# Patient Record
Sex: FEMALE | Race: WHITE | Employment: FULL TIME | URBAN - NONMETROPOLITAN AREA
[De-identification: names, ages, dates, MRNs, and addresses within clinical notes are randomized per-mention and may not be internally consistent; named-entity substitution may affect disease eponyms.]

---

## 2018-02-12 DIAGNOSIS — F41.9 ANXIETY: ICD-10-CM

## 2018-02-12 RX ORDER — PAROXETINE HYDROCHLORIDE 20 MG/1
20 TABLET, FILM COATED ORAL EVERY MORNING
Qty: 90 TABLET | Refills: 3 | Status: SHIPPED | OUTPATIENT
Start: 2018-02-12 | End: 2019-10-10 | Stop reason: SDUPTHER

## 2019-10-10 ENCOUNTER — OFFICE VISIT (OUTPATIENT)
Dept: FAMILY MEDICINE CLINIC | Age: 59
End: 2019-10-10
Payer: COMMERCIAL

## 2019-10-10 VITALS
BODY MASS INDEX: 35.07 KG/M2 | OXYGEN SATURATION: 97 % | HEART RATE: 74 BPM | WEIGHT: 190.6 LBS | DIASTOLIC BLOOD PRESSURE: 82 MMHG | HEIGHT: 62 IN | SYSTOLIC BLOOD PRESSURE: 130 MMHG

## 2019-10-10 DIAGNOSIS — F41.9 ANXIETY: ICD-10-CM

## 2019-10-10 DIAGNOSIS — R23.2 HOT FLASHES: Primary | ICD-10-CM

## 2019-10-10 PROCEDURE — 99213 OFFICE O/P EST LOW 20 MIN: CPT | Performed by: FAMILY MEDICINE

## 2019-10-10 RX ORDER — PAROXETINE HYDROCHLORIDE 20 MG/1
20 TABLET, FILM COATED ORAL EVERY MORNING
Qty: 90 TABLET | Refills: 3 | Status: SHIPPED | OUTPATIENT
Start: 2019-10-10 | End: 2022-06-03 | Stop reason: SDUPTHER

## 2019-10-10 ASSESSMENT — PATIENT HEALTH QUESTIONNAIRE - PHQ9
2. FEELING DOWN, DEPRESSED OR HOPELESS: 0
SUM OF ALL RESPONSES TO PHQ QUESTIONS 1-9: 0
SUM OF ALL RESPONSES TO PHQ QUESTIONS 1-9: 0
SUM OF ALL RESPONSES TO PHQ9 QUESTIONS 1 & 2: 0
1. LITTLE INTEREST OR PLEASURE IN DOING THINGS: 0

## 2019-10-10 ASSESSMENT — ENCOUNTER SYMPTOMS
SHORTNESS OF BREATH: 0
CONSTIPATION: 0
DIARRHEA: 0
COLOR CHANGE: 0
ABDOMINAL PAIN: 0
CHEST TIGHTNESS: 0
BLOOD IN STOOL: 0
COUGH: 0

## 2020-03-25 PROBLEM — K21.9 GERD (GASTROESOPHAGEAL REFLUX DISEASE): Status: RESOLVED | Noted: 2020-03-25 | Resolved: 2020-03-24

## 2020-06-11 ENCOUNTER — OFFICE VISIT (OUTPATIENT)
Dept: FAMILY MEDICINE CLINIC | Age: 60
End: 2020-06-11

## 2020-06-11 VITALS
DIASTOLIC BLOOD PRESSURE: 80 MMHG | TEMPERATURE: 98.3 F | SYSTOLIC BLOOD PRESSURE: 145 MMHG | BODY MASS INDEX: 36.25 KG/M2 | HEART RATE: 78 BPM | WEIGHT: 197 LBS | OXYGEN SATURATION: 98 %

## 2020-06-11 LAB
A/G RATIO: 1.6 (ref 1.1–2.2)
ALBUMIN SERPL-MCNC: 4.4 G/DL (ref 3.4–5)
ALP BLD-CCNC: 113 U/L (ref 40–129)
ALT SERPL-CCNC: 16 U/L (ref 10–40)
ANION GAP SERPL CALCULATED.3IONS-SCNC: 10 MMOL/L (ref 3–16)
AST SERPL-CCNC: 17 U/L (ref 15–37)
BASOPHILS ABSOLUTE: 0.1 K/UL (ref 0–0.2)
BASOPHILS RELATIVE PERCENT: 1.4 %
BILIRUB SERPL-MCNC: 0.4 MG/DL (ref 0–1)
BILIRUBIN, POC: NEGATIVE
BLOOD URINE, POC: NORMAL
BUN BLDV-MCNC: 13 MG/DL (ref 7–20)
C-REACTIVE PROTEIN: 1.3 MG/L (ref 0–5.1)
CALCIUM SERPL-MCNC: 9.7 MG/DL (ref 8.3–10.6)
CHLORIDE BLD-SCNC: 100 MMOL/L (ref 99–110)
CLARITY, POC: NORMAL
CO2: 28 MMOL/L (ref 21–32)
COLOR, POC: YELLOW
CREAT SERPL-MCNC: <0.5 MG/DL (ref 0.6–1.1)
EOSINOPHILS ABSOLUTE: 0.2 K/UL (ref 0–0.6)
EOSINOPHILS RELATIVE PERCENT: 2.8 %
GFR AFRICAN AMERICAN: >60
GFR NON-AFRICAN AMERICAN: >60
GLOBULIN: 2.7 G/DL
GLUCOSE BLD-MCNC: 103 MG/DL (ref 70–99)
GLUCOSE URINE, POC: NEGATIVE
HCT VFR BLD CALC: 45.4 % (ref 36–48)
HEMOGLOBIN: 15 G/DL (ref 12–16)
KETONES, POC: NEGATIVE
LEUKOCYTE EST, POC: NORMAL
LYMPHOCYTES ABSOLUTE: 2.2 K/UL (ref 1–5.1)
LYMPHOCYTES RELATIVE PERCENT: 34.6 %
MCH RBC QN AUTO: 29.4 PG (ref 26–34)
MCHC RBC AUTO-ENTMCNC: 33.1 G/DL (ref 31–36)
MCV RBC AUTO: 89 FL (ref 80–100)
MONOCYTES ABSOLUTE: 0.5 K/UL (ref 0–1.3)
MONOCYTES RELATIVE PERCENT: 7.7 %
NEUTROPHILS ABSOLUTE: 3.4 K/UL (ref 1.7–7.7)
NEUTROPHILS RELATIVE PERCENT: 53.5 %
NITRITE, POC: NEGATIVE
PDW BLD-RTO: 13.9 % (ref 12.4–15.4)
PH, POC: 5.5
PLATELET # BLD: 259 K/UL (ref 135–450)
PMV BLD AUTO: 8.9 FL (ref 5–10.5)
POTASSIUM SERPL-SCNC: 5.1 MMOL/L (ref 3.5–5.1)
PROTEIN, POC: 100
RBC # BLD: 5.1 M/UL (ref 4–5.2)
SEDIMENTATION RATE, ERYTHROCYTE: 15 MM/HR (ref 0–30)
SODIUM BLD-SCNC: 138 MMOL/L (ref 136–145)
SPECIFIC GRAVITY, POC: >=1.03
TOTAL PROTEIN: 7.1 G/DL (ref 6.4–8.2)
UROBILINOGEN, POC: 0.2
WBC # BLD: 6.3 K/UL (ref 4–11)

## 2020-06-11 PROCEDURE — 81003 URINALYSIS AUTO W/O SCOPE: CPT | Performed by: NURSE PRACTITIONER

## 2020-06-11 PROCEDURE — 99213 OFFICE O/P EST LOW 20 MIN: CPT | Performed by: NURSE PRACTITIONER

## 2020-06-11 PROCEDURE — 36415 COLL VENOUS BLD VENIPUNCTURE: CPT | Performed by: NURSE PRACTITIONER

## 2020-06-11 ASSESSMENT — ENCOUNTER SYMPTOMS
DIARRHEA: 1
VOICE CHANGE: 0
SORE THROAT: 0
EYE ITCHING: 0
PHOTOPHOBIA: 0
CHOKING: 0
SINUS PAIN: 0
BACK PAIN: 0
EYE REDNESS: 0
COUGH: 0
COLOR CHANGE: 0
EYE DISCHARGE: 0
CHEST TIGHTNESS: 0
STRIDOR: 0
ABDOMINAL PAIN: 1
WHEEZING: 0
RHINORRHEA: 0
CONSTIPATION: 1
VOMITING: 0
NAUSEA: 1
SINUS PRESSURE: 0
SHORTNESS OF BREATH: 0
EYE PAIN: 0
BLOOD IN STOOL: 0
TROUBLE SWALLOWING: 0

## 2020-06-11 NOTE — PROGRESS NOTES
hearing loss, nosebleeds, postnasal drip, rhinorrhea, sinus pressure, sinus pain, sneezing, sore throat, tinnitus, trouble swallowing and voice change. Eyes: Negative for photophobia, pain, discharge, redness and itching. Respiratory: Negative for cough, choking, chest tightness, shortness of breath, wheezing and stridor. Cardiovascular: Negative for chest pain, palpitations and leg swelling. Gastrointestinal: Positive for abdominal pain, constipation, diarrhea and nausea. Negative for blood in stool and vomiting. Endocrine: Negative for cold intolerance, heat intolerance, polydipsia and polyuria. Genitourinary: Positive for flank pain. Negative for difficulty urinating, dysuria, enuresis, frequency, hematuria and urgency. Musculoskeletal: Negative for back pain, gait problem, joint swelling, neck pain and neck stiffness. Skin: Negative for color change, pallor, rash and wound. Allergic/Immunologic: Negative for environmental allergies and food allergies. Neurological: Negative for dizziness, tremors, syncope, speech difficulty, weakness, light-headedness, numbness and headaches. Hematological: Negative for adenopathy. Does not bruise/bleed easily. Psychiatric/Behavioral: Negative for agitation, behavioral problems, confusion, decreased concentration, dysphoric mood, hallucinations, self-injury, sleep disturbance and suicidal ideas. The patient is not nervous/anxious and is not hyperactive. Prior to Visit Medications    Medication Sig Taking?  Authorizing Provider   PARoxetine (PAXIL) 20 MG tablet Take 1 tablet by mouth every morning Yes Mac Head MD   vitamin B-12 (CYANOCOBALAMIN) 1000 MCG tablet Take 1,000 mcg by mouth daily Yes Historical Provider, MD       Allergies   Allergen Reactions    Morphine      Nausea      Morphine     Penicillins      Nausea      Penicillins     Prochlorperazine      dystonic    Prochlorperazine Edisylate        OBJECTIVE:      BP Readings

## 2020-06-12 LAB — URINE CULTURE, ROUTINE: NORMAL

## 2020-06-12 RX ORDER — OXYCODONE HYDROCHLORIDE AND ACETAMINOPHEN 5; 325 MG/1; MG/1
1 TABLET ORAL EVERY 8 HOURS PRN
Qty: 9 TABLET | Refills: 0 | Status: SHIPPED | OUTPATIENT
Start: 2020-06-12 | End: 2020-06-12 | Stop reason: SDUPTHER

## 2020-06-12 RX ORDER — OXYCODONE HYDROCHLORIDE AND ACETAMINOPHEN 5; 325 MG/1; MG/1
1 TABLET ORAL EVERY 8 HOURS PRN
Qty: 9 TABLET | Refills: 0 | Status: SHIPPED | OUTPATIENT
Start: 2020-06-12 | End: 2020-06-15

## 2020-07-04 ENCOUNTER — HOSPITAL ENCOUNTER (INPATIENT)
Age: 60
LOS: 3 days | Discharge: HOME | DRG: 660 | End: 2020-07-07
Attending: INTERNAL MEDICINE | Admitting: INTERNAL MEDICINE

## 2020-07-04 PROBLEM — N13.30 HYDRONEPHROSIS: Status: ACTIVE | Noted: 2020-07-04

## 2020-07-04 PROCEDURE — 1200000000 HC SEMI PRIVATE

## 2020-07-04 PROCEDURE — 2580000003 HC RX 258: Performed by: INTERNAL MEDICINE

## 2020-07-04 PROCEDURE — 99223 1ST HOSP IP/OBS HIGH 75: CPT | Performed by: INTERNAL MEDICINE

## 2020-07-04 PROCEDURE — 2060000000 HC ICU INTERMEDIATE R&B

## 2020-07-04 PROCEDURE — 2500000003 HC RX 250 WO HCPCS: Performed by: INTERNAL MEDICINE

## 2020-07-04 PROCEDURE — 6360000002 HC RX W HCPCS: Performed by: INTERNAL MEDICINE

## 2020-07-04 PROCEDURE — 6370000000 HC RX 637 (ALT 250 FOR IP): Performed by: INTERNAL MEDICINE

## 2020-07-04 RX ORDER — ACETAMINOPHEN 650 MG/1
650 SUPPOSITORY RECTAL EVERY 6 HOURS PRN
Status: DISCONTINUED | OUTPATIENT
Start: 2020-07-04 | End: 2020-07-07 | Stop reason: HOSPADM

## 2020-07-04 RX ORDER — CHOLECALCIFEROL (VITAMIN D3) 125 MCG
1000 CAPSULE ORAL DAILY
Status: CANCELLED | OUTPATIENT
Start: 2020-07-04

## 2020-07-04 RX ORDER — ONDANSETRON 2 MG/ML
4 INJECTION INTRAMUSCULAR; INTRAVENOUS EVERY 6 HOURS PRN
Status: DISCONTINUED | OUTPATIENT
Start: 2020-07-04 | End: 2020-07-07 | Stop reason: HOSPADM

## 2020-07-04 RX ORDER — SODIUM CHLORIDE 0.9 % (FLUSH) 0.9 %
10 SYRINGE (ML) INJECTION PRN
Status: DISCONTINUED | OUTPATIENT
Start: 2020-07-04 | End: 2020-07-07 | Stop reason: HOSPADM

## 2020-07-04 RX ORDER — PAROXETINE HYDROCHLORIDE 20 MG/1
20 TABLET, FILM COATED ORAL DAILY
Status: DISCONTINUED | OUTPATIENT
Start: 2020-07-04 | End: 2020-07-07 | Stop reason: HOSPADM

## 2020-07-04 RX ORDER — SODIUM CHLORIDE 0.9 % (FLUSH) 0.9 %
10 SYRINGE (ML) INJECTION EVERY 12 HOURS SCHEDULED
Status: CANCELLED | OUTPATIENT
Start: 2020-07-04

## 2020-07-04 RX ORDER — ACETAMINOPHEN 650 MG/1
650 SUPPOSITORY RECTAL EVERY 6 HOURS PRN
Status: CANCELLED | OUTPATIENT
Start: 2020-07-04

## 2020-07-04 RX ORDER — OXYCODONE HYDROCHLORIDE AND ACETAMINOPHEN 5; 325 MG/1; MG/1
1 TABLET ORAL EVERY 6 HOURS PRN
Status: DISCONTINUED | OUTPATIENT
Start: 2020-07-04 | End: 2020-07-07 | Stop reason: HOSPADM

## 2020-07-04 RX ORDER — POLYETHYLENE GLYCOL 3350 17 G/17G
17 POWDER, FOR SOLUTION ORAL DAILY PRN
Status: CANCELLED | OUTPATIENT
Start: 2020-07-04

## 2020-07-04 RX ORDER — ONDANSETRON 2 MG/ML
4 INJECTION INTRAMUSCULAR; INTRAVENOUS EVERY 6 HOURS PRN
Status: CANCELLED | OUTPATIENT
Start: 2020-07-04

## 2020-07-04 RX ORDER — SODIUM CHLORIDE 0.9 % (FLUSH) 0.9 %
10 SYRINGE (ML) INJECTION PRN
Status: CANCELLED | OUTPATIENT
Start: 2020-07-04

## 2020-07-04 RX ORDER — POLYETHYLENE GLYCOL 3350 17 G/17G
17 POWDER, FOR SOLUTION ORAL DAILY PRN
Status: DISCONTINUED | OUTPATIENT
Start: 2020-07-04 | End: 2020-07-07 | Stop reason: HOSPADM

## 2020-07-04 RX ORDER — ACETAMINOPHEN 325 MG/1
650 TABLET ORAL EVERY 6 HOURS PRN
Status: CANCELLED | OUTPATIENT
Start: 2020-07-04

## 2020-07-04 RX ORDER — SODIUM CHLORIDE 9 MG/ML
INJECTION, SOLUTION INTRAVENOUS CONTINUOUS
Status: DISCONTINUED | OUTPATIENT
Start: 2020-07-04 | End: 2020-07-07 | Stop reason: HOSPADM

## 2020-07-04 RX ORDER — KETOROLAC TROMETHAMINE 30 MG/ML
15 INJECTION, SOLUTION INTRAMUSCULAR; INTRAVENOUS EVERY 6 HOURS PRN
Status: DISPENSED | OUTPATIENT
Start: 2020-07-04 | End: 2020-07-06

## 2020-07-04 RX ORDER — SODIUM CHLORIDE 9 MG/ML
INJECTION, SOLUTION INTRAVENOUS CONTINUOUS
Status: CANCELLED | OUTPATIENT
Start: 2020-07-04

## 2020-07-04 RX ORDER — ACETAMINOPHEN 325 MG/1
650 TABLET ORAL EVERY 6 HOURS PRN
Status: DISCONTINUED | OUTPATIENT
Start: 2020-07-04 | End: 2020-07-07 | Stop reason: HOSPADM

## 2020-07-04 RX ORDER — SODIUM CHLORIDE 0.9 % (FLUSH) 0.9 %
10 SYRINGE (ML) INJECTION EVERY 12 HOURS SCHEDULED
Status: DISCONTINUED | OUTPATIENT
Start: 2020-07-04 | End: 2020-07-07 | Stop reason: HOSPADM

## 2020-07-04 RX ORDER — PROMETHAZINE HYDROCHLORIDE 25 MG/1
12.5 TABLET ORAL EVERY 6 HOURS PRN
Status: CANCELLED | OUTPATIENT
Start: 2020-07-04

## 2020-07-04 RX ORDER — PAROXETINE HYDROCHLORIDE 20 MG/1
20 TABLET, FILM COATED ORAL EVERY MORNING
Status: CANCELLED | OUTPATIENT
Start: 2020-07-04

## 2020-07-04 RX ORDER — CIPROFLOXACIN 500 MG/1
500 TABLET, FILM COATED ORAL EVERY 12 HOURS SCHEDULED
Status: DISCONTINUED | OUTPATIENT
Start: 2020-07-04 | End: 2020-07-07 | Stop reason: HOSPADM

## 2020-07-04 RX ADMIN — PAROXETINE HYDROCHLORIDE 20 MG: 20 TABLET, FILM COATED ORAL at 11:51

## 2020-07-04 RX ADMIN — CIPROFLOXACIN 500 MG: 500 TABLET, FILM COATED ORAL at 20:47

## 2020-07-04 RX ADMIN — HYDROMORPHONE HYDROCHLORIDE 0.5 MG: 1 INJECTION, SOLUTION INTRAMUSCULAR; INTRAVENOUS; SUBCUTANEOUS at 23:50

## 2020-07-04 RX ADMIN — OXYCODONE HYDROCHLORIDE AND ACETAMINOPHEN 1 TABLET: 5; 325 TABLET ORAL at 17:18

## 2020-07-04 RX ADMIN — Medication 10 ML: at 11:52

## 2020-07-04 RX ADMIN — SODIUM CHLORIDE: 9 INJECTION, SOLUTION INTRAVENOUS at 17:21

## 2020-07-04 RX ADMIN — KETOROLAC TROMETHAMINE 15 MG: 30 INJECTION, SOLUTION INTRAMUSCULAR; INTRAVENOUS at 12:32

## 2020-07-04 RX ADMIN — Medication 10 ML: at 20:47

## 2020-07-04 RX ADMIN — KETOROLAC TROMETHAMINE 15 MG: 30 INJECTION, SOLUTION INTRAMUSCULAR; INTRAVENOUS at 20:46

## 2020-07-04 RX ADMIN — HYDROMORPHONE HYDROCHLORIDE 0.5 MG: 1 INJECTION, SOLUTION INTRAMUSCULAR; INTRAVENOUS; SUBCUTANEOUS at 16:11

## 2020-07-04 RX ADMIN — HYDROMORPHONE HYDROCHLORIDE 0.5 MG: 1 INJECTION, SOLUTION INTRAMUSCULAR; INTRAVENOUS; SUBCUTANEOUS at 11:06

## 2020-07-04 ASSESSMENT — PAIN SCALES - GENERAL
PAINLEVEL_OUTOF10: 5
PAINLEVEL_OUTOF10: 5
PAINLEVEL_OUTOF10: 4
PAINLEVEL_OUTOF10: 3
PAINLEVEL_OUTOF10: 5
PAINLEVEL_OUTOF10: 6
PAINLEVEL_OUTOF10: 8
PAINLEVEL_OUTOF10: 3
PAINLEVEL_OUTOF10: 7

## 2020-07-04 ASSESSMENT — PAIN DESCRIPTION - PAIN TYPE
TYPE: ACUTE PAIN
TYPE: ACUTE PAIN

## 2020-07-04 ASSESSMENT — PAIN DESCRIPTION - ONSET
ONSET: ON-GOING
ONSET: ON-GOING

## 2020-07-04 ASSESSMENT — PAIN DESCRIPTION - LOCATION
LOCATION: ABDOMEN
LOCATION: ABDOMEN

## 2020-07-04 ASSESSMENT — PAIN DESCRIPTION - FREQUENCY
FREQUENCY: CONTINUOUS
FREQUENCY: CONTINUOUS

## 2020-07-04 ASSESSMENT — PAIN DESCRIPTION - PROGRESSION
CLINICAL_PROGRESSION: GRADUALLY WORSENING
CLINICAL_PROGRESSION: GRADUALLY WORSENING

## 2020-07-04 ASSESSMENT — PAIN DESCRIPTION - DESCRIPTORS
DESCRIPTORS: SHARP
DESCRIPTORS: SHARP

## 2020-07-04 ASSESSMENT — PAIN DESCRIPTION - ORIENTATION
ORIENTATION: RIGHT
ORIENTATION: RIGHT

## 2020-07-04 NOTE — PROGRESS NOTES
Received from [facility] via patient transport service. Alert and oriented. States pain med given at [facility] STATION is effective for pain right now. Oriented to room and use of call light. Instructed on NPO status. Pt. Verbalized understanding. Patient is able to demonstrated the ability to move from a reclining position to an upright position within the recliner.

## 2020-07-04 NOTE — H&P
Hospital Medicine History & Physical      PCP: Romario Keller MD    Date of Admission: 7/4/2020    Date of Service: Pt seen/examined on 7/4/2020    Chief Complaint: abdominal pain, N/V     History Of Present Illness: The patient is a 63 y.o. female with anxiety, GERD, fibromyalgia, YRN and recent renal stone s/p double J ureteral stent placement and lithotripsy on 6/29 who presented to [facility] ED with complaint of worsening abdominal pain and malaise. She reports that she was doing well despite hematuria after her procedure but developed some urinary symptoms and went to the ER at [facility] on Thursday. She reports she was dx with a UTI and was started on Cipro. She states that she has been compliant with her medications, but her pain continued and she developed N/V and returned to [facility] ER again. She had a CT scan which showed worsening hydronephrosis with perinephritic stranding and concerns for possible stent failure and was transferred to [facility] for urology consult. She denies any CP, cough, SOB or fevers at home. Past Medical History:        Diagnosis Date    Anxiety     Esophageal reflux     Fibromyalgia     GERD (gastroesophageal reflux disease)     Hydronephrosis 7/4/2020    Unspecified sleep apnea     cpap       Past Surgical History:        Procedure Laterality Date    CHOLECYSTECTOMY      CYSTOSCOPY  1/18/2013    HYSTEROSCOPY  2012    TOENAIL AVULSION         Medications Prior to Admission:    Prior to Admission medications    Medication Sig Start Date End Date Taking? Authorizing Provider   PARoxetine (PAXIL) 20 MG tablet Take 1 tablet by mouth every morning 10/10/19  Yes Romario Keller MD   vitamin B-12 (CYANOCOBALAMIN) 1000 MCG tablet Take 1,000 mcg by mouth daily    Historical Provider, MD       Allergies:  Morphine; Morphine; Penicillins;  Penicillins; Prochlorperazine; and Prochlorperazine edisylate    Social History:  The patient currently lives at home    TOBACCO: reports that she has never smoked. She has never used smokeless tobacco.  ETOH:   reports no history of alcohol use. Family History:   Positive as follows:        Problem Relation Age of Onset    Arthritis Mother     Asthma Mother     High Blood Pressure Mother     Heart Disease Sister     Heart Disease Father     Cancer Sister         breast       REVIEW OF SYSTEMS:     Constitutional: + malaise, Negative for fever   HENT: Negative for sore throat   Eyes: Negative for redness   Respiratory: Negative  for dyspnea, cough   Cardiovascular: Negative for chest pain   Gastrointestinal: + abdominal pain, + N/V  Genitourinary: + hematuria   Musculoskeletal: Negative for arthralgias   Skin: Negative for rash   Neurological: Negative for syncope   Hematological: Negative for adenopathy   Psychiatric/Behavorial: Negative for anxiety    PHYSICAL EXAM:    BP (!) 154/76   Pulse 78   Temp 98.4 °F (36.9 °C) (Oral)   Resp 18   Ht 5' 2\" (1.575 m)   Wt 195 lb (88.5 kg)   LMP 06/19/2016   SpO2 98%   BMI 35.67 kg/m²     Gen: No distress. Alert. Eyes:  No conjunctival injection. ENT: No discharge. Pharynx clear. Neck:  Trachea midline. Resp: No accessory muscle use. No crackles. No wheezes. No rhonchi. CV: Regular rate. Regular rhythm. No murmur. No rub. No edema. Capillary Refill: Brisk,< 3 seconds   Peripheral Pulses: +2 palpable, equal bilaterally   GI: Non-tender. Non-distended Normal bowel sounds. Skin: Warm and dry. M/S: No CVA TTP bilaterally  Neuro: Awake. Grossly nonfocal    Psych: Oriented x 3. No anxiety or agitation. I Thalia Powell [facility] and personally interviewed and examined patient, reviewed the data (labs and imaging) and after discussion with my PA formulated the plan. Agree with note with the following edits. HPI:     I reviewed the patient's Past Medical History, Past Surgical History, Medications, and Allergies.        7 y.o. female with anxiety, GERD, fibromyalgia, YRN and recent renal stone s/p double J ureteral stent placement and lithotripsy on 6/29 who presented to [facility] ED with complaint of worsening abdominal pain and malaise. She reports that she was doing well despite hematuria after her procedure but developed some urinary symptoms and went to the ER at [facility] on Thursday. She reports she was dx with a UTI and was started on Cipro. She states that she has been compliant with her medications, but her pain continued and she developed N/V and returned to [facility] ER again. General:  Awake, alert and oriented. Appears to be not in any distress  Mucous Membranes:  Pink , anicteric  Neck: No JVD, no carotid bruit, no thyromegaly  Chest:  Clear to auscultation bilaterally, no added sounds  Cardiovascular:  RRR S1S2 heard, no murmurs or gallops  Abdomen:  Soft, undistended, right flank pain and right cva tenderness , no organomegaly, BS present  Extremities: No edema or cyanosis.  Distal pulses well felt  Neurological : grossly normal              CBC:   Recent Labs     07/03/20 0055 07/04/20 0018   WBC 7.1 9.6   HGB 12.3 12.5   HCT 38.2 37.9   MCV 91.0 88.6    238     BMP:   Recent Labs     07/03/20 0055 07/04/20 0018    141   K 3.2* 3.7    104   CO2 30 25   BUN 12 15   CREATININE 0.9 1.0     LIVER PROFILE:   Recent Labs     07/03/20 0055 07/04/20 0018   AST 16 20   ALT 25 27   BILITOT 0.4 0.4   ALKPHOS 97 105     PT/INR:   Recent Labs     07/03/20 0055   INR 0.97*     APTT:   Recent Labs     07/03/20 0055   APTT 26.6     UA:  Recent Labs     07/04/20  0026   COLORU YELLOW   PHUR 8.0*   LABCAST NEGATIVE   MUCUS NEGATIVE   TRICHOMONAS NEGATIVE   YEAST NEGATIVE   BACTERIA RARE   CLARITYU CLEAR   SPECGRAV 1.025   LEUKOCYTESUR SMALL  0-5   UROBILINOGEN 0.2   BILIRUBINUR NEGATIVE   GLUCOSEU NEGATIVE   AMORPHOUS NEGATIVE     CULTURES  None     EKG:    No EKG from this admission for review    RADIOLOGY  No orders to display     Pertinent previous results reviewed   CT A/P (From [facility])   IMPRESSION:   1. Right double-J ureteral stent in place, with interval worsening of the   right hydronephrosis, now mild to moderate in degree.  Stent may be   nonfunctioning given the worsening hydronephrosis   2. Multiple stone fragments seen adjacent to the mid and distal portions of   the ureteral stent, and within the right pelvocaliceal system. .   3. Small amount of free fluid seen within the cul-de-sac         ASSESSMENT/PLAN:    R hydronephrosis   - worsening since ureteral stent placement on Monday concerning for possible non-functioning stent  - retained fragments from stone from lithotripsy on Monday as well, one as large as 7mm  - Urology consult   - PRN pain and symptom control   - No associated TORO     UTI  - recently dx at [facility]   - Will attempt to get urine cx results   - Continue Cipro for now     YRN  - home CPAP    DVT Prophylaxis: Lovenox   Diet: Diet NPO, After Midnight  DIET GENERAL;   Code Status: Full Code      Ronna Patel PA-C  7/4/2020 10:59 AM     Agree with above  Changes made to note    Dipti Cowart MD 7/4/2020 2:59 PM

## 2020-07-04 NOTE — PLAN OF CARE
Problem: Infection:  Goal: Will remain free from infection  Description: Will remain free from infection  Outcome: Ongoing     Problem: Safety:  Goal: Free from accidental physical injury  Description: Free from accidental physical injury  Outcome: Ongoing     Problem: Pain:  Goal: Patient's pain/discomfort is manageable  Description: Patient's pain/discomfort is manageable  Outcome: Ongoing

## 2020-07-05 LAB
ANION GAP SERPL CALCULATED.3IONS-SCNC: 9 MMOL/L (ref 3–16)
BUN BLDV-MCNC: 10 MG/DL (ref 7–20)
CALCIUM SERPL-MCNC: 8 MG/DL (ref 8.3–10.6)
CHLORIDE BLD-SCNC: 106 MMOL/L (ref 99–110)
CO2: 26 MMOL/L (ref 21–32)
CREAT SERPL-MCNC: 0.8 MG/DL (ref 0.6–1.1)
GFR AFRICAN AMERICAN: >60
GFR NON-AFRICAN AMERICAN: >60
GLUCOSE BLD-MCNC: 105 MG/DL (ref 70–99)
POTASSIUM REFLEX MAGNESIUM: 4.1 MMOL/L (ref 3.5–5.1)
SODIUM BLD-SCNC: 141 MMOL/L (ref 136–145)

## 2020-07-05 PROCEDURE — 99232 SBSQ HOSP IP/OBS MODERATE 35: CPT | Performed by: INTERNAL MEDICINE

## 2020-07-05 PROCEDURE — 1200000000 HC SEMI PRIVATE

## 2020-07-05 PROCEDURE — 6370000000 HC RX 637 (ALT 250 FOR IP): Performed by: INTERNAL MEDICINE

## 2020-07-05 PROCEDURE — 2500000003 HC RX 250 WO HCPCS: Performed by: INTERNAL MEDICINE

## 2020-07-05 PROCEDURE — 2580000003 HC RX 258: Performed by: INTERNAL MEDICINE

## 2020-07-05 PROCEDURE — 6360000002 HC RX W HCPCS: Performed by: INTERNAL MEDICINE

## 2020-07-05 PROCEDURE — 80048 BASIC METABOLIC PNL TOTAL CA: CPT

## 2020-07-05 PROCEDURE — 36415 COLL VENOUS BLD VENIPUNCTURE: CPT

## 2020-07-05 PROCEDURE — 6370000000 HC RX 637 (ALT 250 FOR IP): Performed by: UROLOGY

## 2020-07-05 RX ORDER — DOCUSATE SODIUM 100 MG/1
100 CAPSULE, LIQUID FILLED ORAL 2 TIMES DAILY
Status: DISCONTINUED | OUTPATIENT
Start: 2020-07-05 | End: 2020-07-07 | Stop reason: HOSPADM

## 2020-07-05 RX ADMIN — HYDROMORPHONE HYDROCHLORIDE 0.5 MG: 1 INJECTION, SOLUTION INTRAMUSCULAR; INTRAVENOUS; SUBCUTANEOUS at 07:59

## 2020-07-05 RX ADMIN — PAROXETINE HYDROCHLORIDE 20 MG: 20 TABLET, FILM COATED ORAL at 09:04

## 2020-07-05 RX ADMIN — ENOXAPARIN SODIUM 40 MG: 40 INJECTION SUBCUTANEOUS at 09:04

## 2020-07-05 RX ADMIN — SODIUM CHLORIDE: 9 INJECTION, SOLUTION INTRAVENOUS at 20:51

## 2020-07-05 RX ADMIN — KETOROLAC TROMETHAMINE 15 MG: 30 INJECTION, SOLUTION INTRAMUSCULAR; INTRAVENOUS at 18:13

## 2020-07-05 RX ADMIN — Medication 10 ML: at 09:07

## 2020-07-05 RX ADMIN — OXYCODONE HYDROCHLORIDE AND ACETAMINOPHEN 1 TABLET: 5; 325 TABLET ORAL at 06:56

## 2020-07-05 RX ADMIN — HYDROMORPHONE HYDROCHLORIDE 0.5 MG: 1 INJECTION, SOLUTION INTRAMUSCULAR; INTRAVENOUS; SUBCUTANEOUS at 11:56

## 2020-07-05 RX ADMIN — HYDROMORPHONE HYDROCHLORIDE 0.5 MG: 1 INJECTION, SOLUTION INTRAMUSCULAR; INTRAVENOUS; SUBCUTANEOUS at 03:57

## 2020-07-05 RX ADMIN — CIPROFLOXACIN 500 MG: 500 TABLET, FILM COATED ORAL at 09:04

## 2020-07-05 RX ADMIN — CIPROFLOXACIN 500 MG: 500 TABLET, FILM COATED ORAL at 20:50

## 2020-07-05 RX ADMIN — DOCUSATE SODIUM 100 MG: 100 CAPSULE, LIQUID FILLED ORAL at 20:50

## 2020-07-05 RX ADMIN — DOCUSATE SODIUM 100 MG: 100 CAPSULE, LIQUID FILLED ORAL at 11:50

## 2020-07-05 RX ADMIN — HYDROMORPHONE HYDROCHLORIDE 0.5 MG: 1 INJECTION, SOLUTION INTRAMUSCULAR; INTRAVENOUS; SUBCUTANEOUS at 17:43

## 2020-07-05 RX ADMIN — OXYCODONE HYDROCHLORIDE AND ACETAMINOPHEN 1 TABLET: 5; 325 TABLET ORAL at 23:17

## 2020-07-05 RX ADMIN — SODIUM CHLORIDE: 9 INJECTION, SOLUTION INTRAVENOUS at 02:54

## 2020-07-05 RX ADMIN — OXYCODONE HYDROCHLORIDE AND ACETAMINOPHEN 1 TABLET: 5; 325 TABLET ORAL at 15:30

## 2020-07-05 RX ADMIN — KETOROLAC TROMETHAMINE 15 MG: 30 INJECTION, SOLUTION INTRAMUSCULAR; INTRAVENOUS at 02:52

## 2020-07-05 ASSESSMENT — PAIN SCALES - GENERAL
PAINLEVEL_OUTOF10: 6
PAINLEVEL_OUTOF10: 5
PAINLEVEL_OUTOF10: 3
PAINLEVEL_OUTOF10: 6
PAINLEVEL_OUTOF10: 5
PAINLEVEL_OUTOF10: 3
PAINLEVEL_OUTOF10: 4
PAINLEVEL_OUTOF10: 6
PAINLEVEL_OUTOF10: 4
PAINLEVEL_OUTOF10: 5
PAINLEVEL_OUTOF10: 2
PAINLEVEL_OUTOF10: 5
PAINLEVEL_OUTOF10: 0

## 2020-07-05 ASSESSMENT — PAIN DESCRIPTION - DESCRIPTORS
DESCRIPTORS: SHARP
DESCRIPTORS: SHARP;PRESSURE

## 2020-07-05 ASSESSMENT — PAIN DESCRIPTION - PAIN TYPE
TYPE: ACUTE PAIN

## 2020-07-05 ASSESSMENT — PAIN DESCRIPTION - PROGRESSION
CLINICAL_PROGRESSION: GRADUALLY WORSENING
CLINICAL_PROGRESSION: GRADUALLY WORSENING
CLINICAL_PROGRESSION: NOT CHANGED

## 2020-07-05 ASSESSMENT — PAIN DESCRIPTION - ONSET
ONSET: ON-GOING

## 2020-07-05 ASSESSMENT — PAIN DESCRIPTION - LOCATION
LOCATION: ABDOMEN

## 2020-07-05 ASSESSMENT — PAIN DESCRIPTION - FREQUENCY
FREQUENCY: CONTINUOUS

## 2020-07-05 ASSESSMENT — PAIN DESCRIPTION - ORIENTATION
ORIENTATION: RIGHT

## 2020-07-05 ASSESSMENT — PAIN - FUNCTIONAL ASSESSMENT: PAIN_FUNCTIONAL_ASSESSMENT: ACTIVITIES ARE NOT PREVENTED

## 2020-07-05 NOTE — PROGRESS NOTES
IM Progress Note    Admit Date:  7/4/2020  1    Interval history:  No fevers      Subjective:  Ms. Bryanna Wick reports persistent pain in right flank. No hematuria  Pain meds helping    Objective:   /64   Pulse 79   Temp 97.6 °F (36.4 °C) (Oral)   Resp 16   Ht 5' 2\" (1.575 m)   Wt 195 lb (88.5 kg)   LMP 06/19/2016   SpO2 97%   BMI 35.67 kg/m²       Intake/Output Summary (Last 24 hours) at 7/5/2020 0811  Last data filed at 7/5/2020 2225  Gross per 24 hour   Intake 2758 ml   Output --   Net 2758 ml       Physical Exam:    General:  Awake, alert and oriented. Appears to be not in any distress  Mucous Membranes:  Pink , anicteric  Neck: No JVD, no carotid bruit, no thyromegaly  Chest:  Clear to auscultation bilaterally, no added sounds  Cardiovascular:  RRR S1S2 heard, no murmurs or gallops  Abdomen:  Soft, undistended, right flank pain and right cva tenderness , no organomegaly, BS present  Extremities: No edema or cyanosis. Distal pulses well felt  Neurological : grossly normal    Medications:   Scheduled Medications:    sodium chloride flush  10 mL Intravenous 2 times per day    enoxaparin  40 mg Subcutaneous Daily    PARoxetine  20 mg Oral Daily    ciprofloxacin  500 mg Oral 2 times per day     I   sodium chloride 100 mL/hr at 07/05/20 0254     sodium chloride flush, acetaminophen **OR** acetaminophen, polyethylene glycol, ondansetron, HYDROmorphone, ketorolac, oxyCODONE-acetaminophen    Lab Data:  Recent Labs     07/03/20  0055 07/04/20  0018   WBC 7.1 9.6   HGB 12.3 12.5   HCT 38.2 37.9   MCV 91.0 88.6    238     Recent Labs     07/03/20  0055 07/04/20  0018 07/05/20  0627    141 141   K 3.2* 3.7 4.1    104 106   CO2 30 25 26   BUN 12 15 10   CREATININE 0.9 1.0 0.8     No results for input(s): CKTOTAL, CKMB, CKMBINDEX, TROPONINI in the last 72 hours.     Coagulation:   Lab Results   Component Value Date    INR 0.97 07/03/2020    APTT 26.6 07/03/2020     Cardiac markers: No results

## 2020-07-05 NOTE — PLAN OF CARE
Problem: Infection:  Goal: Will remain free from infection  Description: Will remain free from infection  7/5/2020 1721 by Jhoan Bush RN  Outcome: Ongoing  7/5/2020 1416 by Jhoan Bush RN  Outcome: Ongoing     Problem: Pain:  Goal: Patient's pain/discomfort is manageable  Description: Patient's pain/discomfort is manageable  7/5/2020 1721 by Jhoan Bush RN  Outcome: Ongoing  7/5/2020 1416 by Jhoan Bush RN  Outcome: Ongoing     Problem: Pain:  Goal: Pain level will decrease  Description: Pain level will decrease  Outcome: Ongoing

## 2020-07-05 NOTE — PLAN OF CARE
Problem: Infection:  Goal: Will remain free from infection  Description: Will remain free from infection  7/5/2020 1416 by Tray Echevarria RN  Outcome: Ongoing  7/5/2020 0028 by Felix Marshall RN  Outcome: Ongoing     Problem: Pain:  Goal: Patient's pain/discomfort is manageable  Description: Patient's pain/discomfort is manageable  7/5/2020 1416 by Tray Echevarria RN  Outcome: Ongoing  7/5/2020 0028 by Felix Marshall RN  Outcome: Ongoing

## 2020-07-05 NOTE — PROGRESS NOTES
Resting in bed.  at bedside. No new c/o. Aware of plan for NPO post midnight. Denies pain at this time.

## 2020-07-05 NOTE — PROGRESS NOTES
Shift assessment complete. Pt is alert and oriented. Pt is hypotensive; toradol given for pain. Respirations are even and easy. No other complaints voiced. Pt denies needs at this time. SR up x 2 and bed in low position. Call light is within reach. Will continue to monitor.

## 2020-07-05 NOTE — PROGRESS NOTES
Awake and in bed. C/o right flank pain. Dilaudid given per request. Aware of NPO post midnight tonight for procedure tomorrow. Encouraged out of bed and ambulating in room today. Pt verbalized understanding.

## 2020-07-05 NOTE — CONSULTS
7/5/2020  Eva Brumfield    Reason for Consult:  Kidney stones , stent, hydronephrosis  Requesting Physician:  Man Lopez      History Obtained From:  patient    HISTORY OF PRESENT ILLNESS:                The patient is a 63 y.o. female who presents with increasing right flank pain, severe over last 2-3 days. She is s/p ESWL last Monday for large renal stone , has stent placed at that time. She had some nausea and vomiting, no fever or chills. CT scan multiple stone fragments and possible obstructed stent. Past Medical History:        Diagnosis Date    Anxiety     Esophageal reflux     Fibromyalgia     GERD (gastroesophageal reflux disease)     Hydronephrosis 7/4/2020    Unspecified sleep apnea     cpap     Past Surgical History:        Procedure Laterality Date    CHOLECYSTECTOMY      CYSTOSCOPY  1/18/2013    HYSTEROSCOPY  2012    TOENAIL AVULSION       Current Medications:   Current Facility-Administered Medications: sodium chloride flush 0.9 % injection 10 mL, 10 mL, Intravenous, 2 times per day  sodium chloride flush 0.9 % injection 10 mL, 10 mL, Intravenous, PRN  acetaminophen (TYLENOL) tablet 650 mg, 650 mg, Oral, Q6H PRN **OR** acetaminophen (TYLENOL) suppository 650 mg, 650 mg, Rectal, Q6H PRN  polyethylene glycol (GLYCOLAX) packet 17 g, 17 g, Oral, Daily PRN  enoxaparin (LOVENOX) injection 40 mg, 40 mg, Subcutaneous, Daily  ondansetron (ZOFRAN) injection 4 mg, 4 mg, Intravenous, Q6H PRN  0.9 % sodium chloride infusion, , Intravenous, Continuous  HYDROmorphone (DILAUDID) injection 0.5 mg, 0.5 mg, Intravenous, Q4H PRN  ketorolac (TORADOL) injection 15 mg, 15 mg, Intravenous, Q6H PRN  PARoxetine (PAXIL) tablet 20 mg, 20 mg, Oral, Daily  ciprofloxacin (CIPRO) tablet 500 mg, 500 mg, Oral, 2 times per day  oxyCODONE-acetaminophen (PERCOCET) 5-325 MG per tablet 1 tablet, 1 tablet, Oral, Q6H PRN  Allergies:     Allergies   Allergen Reactions    Morphine      Nausea      Morphine     Penicillins      Nausea      Penicillins     Prochlorperazine      dystonic    Prochlorperazine Edisylate      Social History:  Reviewed, non contributory    Family History:  Reviewed, non contributory      REVIEW OF SYSTEMS:    12 system review , negative except gu symptoms and some constipation    PHYSICAL EXAM:    VITALS:  /64   Pulse 79   Temp 97.6 °F (36.4 °C) (Oral)   Resp 16   Ht 5' 2\" (1.575 m)   Wt 195 lb (88.5 kg)   LMP 06/19/2016   SpO2 97%   BMI 35.67 kg/m²   H&N: Sclera normal, no masses, trachea midline, no bruit  CVS: Normal rate and rhythm, no murmurs or rubs, peripheral pulses equal, no clubbing or cyanosis. RESP: Breath sounds equal bilateral, few rhonchi. ABDO: Soft, non-tender, bowel sounds active, no organomegaly, no hernias. LYMPH:  No lymphadenopathy. Skin: Warm dry and intact. : No CVAT, normal external genitalia, no discharge. MSK: Grossly normal for patient  GALDINO: Grossly normal for patient  PSY: No acute changes noted in psychosocial assessment. DATA:    Old records have not been requested    IMPRESSION/RECOMMENDATIONS:      Pt is s/p ESWL, increasing right flank plain, she has multiple stone fragments on recent ct with some worsening hydro. She is afebrile and normal renal function. Explained options , will add on for dr Lavon Rivera tomorrow for ureteroscopy, pt understands she may need another stent and may not be able to clear all of her fragments in one procedure    Thank you for asking me to see this interesting patient.     [facility]

## 2020-07-05 NOTE — PROGRESS NOTES
Call from Dr. Fredi Olmstead, would like to move pt's surgery to tomorrow. Spoke with pt to let her know; Dr. Fredi Olmstead will be here later today to discuss with pt.

## 2020-07-06 ENCOUNTER — ANESTHESIA (OUTPATIENT)
Dept: OPERATING ROOM | Age: 60
DRG: 660 | End: 2020-07-06

## 2020-07-06 ENCOUNTER — ANESTHESIA EVENT (OUTPATIENT)
Dept: OPERATING ROOM | Age: 60
DRG: 660 | End: 2020-07-06

## 2020-07-06 ENCOUNTER — APPOINTMENT (OUTPATIENT)
Dept: GENERAL RADIOLOGY | Age: 60
DRG: 660 | End: 2020-07-06
Attending: INTERNAL MEDICINE

## 2020-07-06 VITALS
RESPIRATION RATE: 30 BRPM | DIASTOLIC BLOOD PRESSURE: 63 MMHG | OXYGEN SATURATION: 85 % | SYSTOLIC BLOOD PRESSURE: 95 MMHG

## 2020-07-06 PROCEDURE — 6360000002 HC RX W HCPCS: Performed by: INTERNAL MEDICINE

## 2020-07-06 PROCEDURE — 88300 SURGICAL PATH GROSS: CPT

## 2020-07-06 PROCEDURE — 0TP98DZ REMOVAL OF INTRALUMINAL DEVICE FROM URETER, VIA NATURAL OR ARTIFICIAL OPENING ENDOSCOPIC: ICD-10-PCS | Performed by: UROLOGY

## 2020-07-06 PROCEDURE — 6370000000 HC RX 637 (ALT 250 FOR IP): Performed by: INTERNAL MEDICINE

## 2020-07-06 PROCEDURE — 2709999900 HC NON-CHARGEABLE SUPPLY: Performed by: UROLOGY

## 2020-07-06 PROCEDURE — 2580000003 HC RX 258: Performed by: INTERNAL MEDICINE

## 2020-07-06 PROCEDURE — 99232 SBSQ HOSP IP/OBS MODERATE 35: CPT | Performed by: INTERNAL MEDICINE

## 2020-07-06 PROCEDURE — BT1D1ZZ FLUOROSCOPY OF RIGHT KIDNEY, URETER AND BLADDER USING LOW OSMOLAR CONTRAST: ICD-10-PCS | Performed by: UROLOGY

## 2020-07-06 PROCEDURE — 3700000001 HC ADD 15 MINUTES (ANESTHESIA): Performed by: UROLOGY

## 2020-07-06 PROCEDURE — 3600000004 HC SURGERY LEVEL 4 BASE: Performed by: UROLOGY

## 2020-07-06 PROCEDURE — C2617 STENT, NON-COR, TEM W/O DEL: HCPCS | Performed by: UROLOGY

## 2020-07-06 PROCEDURE — 3600000014 HC SURGERY LEVEL 4 ADDTL 15MIN: Performed by: UROLOGY

## 2020-07-06 PROCEDURE — 6360000002 HC RX W HCPCS: Performed by: NURSE ANESTHETIST, CERTIFIED REGISTERED

## 2020-07-06 PROCEDURE — 3700000000 HC ANESTHESIA ATTENDED CARE: Performed by: UROLOGY

## 2020-07-06 PROCEDURE — 7100000000 HC PACU RECOVERY - FIRST 15 MIN: Performed by: UROLOGY

## 2020-07-06 PROCEDURE — 0TC68ZZ EXTIRPATION OF MATTER FROM RIGHT URETER, VIA NATURAL OR ARTIFICIAL OPENING ENDOSCOPIC: ICD-10-PCS | Performed by: UROLOGY

## 2020-07-06 PROCEDURE — 6360000004 HC RX CONTRAST MEDICATION: Performed by: UROLOGY

## 2020-07-06 PROCEDURE — 6360000002 HC RX W HCPCS: Performed by: ANESTHESIOLOGY

## 2020-07-06 PROCEDURE — 74420 UROGRAPHY RTRGR +-KUB: CPT

## 2020-07-06 PROCEDURE — C1769 GUIDE WIRE: HCPCS | Performed by: UROLOGY

## 2020-07-06 PROCEDURE — 6370000000 HC RX 637 (ALT 250 FOR IP): Performed by: UROLOGY

## 2020-07-06 PROCEDURE — 2500000003 HC RX 250 WO HCPCS: Performed by: NURSE ANESTHETIST, CERTIFIED REGISTERED

## 2020-07-06 PROCEDURE — 82365 CALCULUS SPECTROSCOPY: CPT

## 2020-07-06 PROCEDURE — 7100000001 HC PACU RECOVERY - ADDTL 15 MIN: Performed by: UROLOGY

## 2020-07-06 PROCEDURE — 1200000000 HC SEMI PRIVATE

## 2020-07-06 PROCEDURE — 2720000010 HC SURG SUPPLY STERILE: Performed by: UROLOGY

## 2020-07-06 PROCEDURE — 2580000003 HC RX 258: Performed by: UROLOGY

## 2020-07-06 PROCEDURE — 0T768DZ DILATION OF RIGHT URETER WITH INTRALUMINAL DEVICE, VIA NATURAL OR ARTIFICIAL OPENING ENDOSCOPIC: ICD-10-PCS | Performed by: UROLOGY

## 2020-07-06 DEVICE — URETERAL STENT
Type: IMPLANTABLE DEVICE | Site: URETER | Status: FUNCTIONAL
Brand: PERCUFLEX™ PLUS

## 2020-07-06 RX ORDER — FENTANYL CITRATE 50 UG/ML
INJECTION, SOLUTION INTRAMUSCULAR; INTRAVENOUS PRN
Status: DISCONTINUED | OUTPATIENT
Start: 2020-07-06 | End: 2020-07-06 | Stop reason: SDUPTHER

## 2020-07-06 RX ORDER — LIDOCAINE HYDROCHLORIDE 20 MG/ML
JELLY TOPICAL PRN
Status: DISCONTINUED | OUTPATIENT
Start: 2020-07-06 | End: 2020-07-06 | Stop reason: HOSPADM

## 2020-07-06 RX ORDER — LABETALOL HYDROCHLORIDE 5 MG/ML
5 INJECTION, SOLUTION INTRAVENOUS EVERY 10 MIN PRN
Status: DISCONTINUED | OUTPATIENT
Start: 2020-07-06 | End: 2020-07-06 | Stop reason: HOSPADM

## 2020-07-06 RX ORDER — OXYCODONE HYDROCHLORIDE AND ACETAMINOPHEN 5; 325 MG/1; MG/1
1 TABLET ORAL PRN
Status: DISCONTINUED | OUTPATIENT
Start: 2020-07-06 | End: 2020-07-06 | Stop reason: HOSPADM

## 2020-07-06 RX ORDER — OXYCODONE HYDROCHLORIDE AND ACETAMINOPHEN 5; 325 MG/1; MG/1
2 TABLET ORAL PRN
Status: DISCONTINUED | OUTPATIENT
Start: 2020-07-06 | End: 2020-07-06 | Stop reason: HOSPADM

## 2020-07-06 RX ORDER — ONDANSETRON 2 MG/ML
4 INJECTION INTRAMUSCULAR; INTRAVENOUS
Status: DISCONTINUED | OUTPATIENT
Start: 2020-07-06 | End: 2020-07-06 | Stop reason: HOSPADM

## 2020-07-06 RX ORDER — HYDRALAZINE HYDROCHLORIDE 20 MG/ML
5 INJECTION INTRAMUSCULAR; INTRAVENOUS EVERY 10 MIN PRN
Status: DISCONTINUED | OUTPATIENT
Start: 2020-07-06 | End: 2020-07-06 | Stop reason: HOSPADM

## 2020-07-06 RX ORDER — ALPRAZOLAM 0.5 MG/1
0.5 TABLET ORAL EVERY 6 HOURS PRN
Status: DISCONTINUED | OUTPATIENT
Start: 2020-07-06 | End: 2020-07-07 | Stop reason: HOSPADM

## 2020-07-06 RX ORDER — ROCURONIUM BROMIDE 10 MG/ML
INJECTION, SOLUTION INTRAVENOUS PRN
Status: DISCONTINUED | OUTPATIENT
Start: 2020-07-06 | End: 2020-07-06 | Stop reason: SDUPTHER

## 2020-07-06 RX ORDER — MAGNESIUM HYDROXIDE 1200 MG/15ML
LIQUID ORAL PRN
Status: DISCONTINUED | OUTPATIENT
Start: 2020-07-06 | End: 2020-07-06 | Stop reason: HOSPADM

## 2020-07-06 RX ORDER — DIPHENHYDRAMINE HYDROCHLORIDE 50 MG/ML
12.5 INJECTION INTRAMUSCULAR; INTRAVENOUS
Status: DISCONTINUED | OUTPATIENT
Start: 2020-07-06 | End: 2020-07-06 | Stop reason: HOSPADM

## 2020-07-06 RX ORDER — MEPERIDINE HYDROCHLORIDE 25 MG/ML
12.5 INJECTION INTRAMUSCULAR; INTRAVENOUS; SUBCUTANEOUS EVERY 5 MIN PRN
Status: DISCONTINUED | OUTPATIENT
Start: 2020-07-06 | End: 2020-07-06 | Stop reason: HOSPADM

## 2020-07-06 RX ORDER — PROPOFOL 10 MG/ML
INJECTION, EMULSION INTRAVENOUS PRN
Status: DISCONTINUED | OUTPATIENT
Start: 2020-07-06 | End: 2020-07-06 | Stop reason: SDUPTHER

## 2020-07-06 RX ADMIN — DOCUSATE SODIUM 100 MG: 100 CAPSULE, LIQUID FILLED ORAL at 20:53

## 2020-07-06 RX ADMIN — SODIUM CHLORIDE: 9 INJECTION, SOLUTION INTRAVENOUS at 02:17

## 2020-07-06 RX ADMIN — OXYCODONE HYDROCHLORIDE AND ACETAMINOPHEN 1 TABLET: 5; 325 TABLET ORAL at 09:53

## 2020-07-06 RX ADMIN — PAROXETINE HYDROCHLORIDE 20 MG: 20 TABLET, FILM COATED ORAL at 08:39

## 2020-07-06 RX ADMIN — ALPRAZOLAM 0.5 MG: 0.5 TABLET ORAL at 11:48

## 2020-07-06 RX ADMIN — FENTANYL CITRATE 100 MCG: 50 INJECTION INTRAMUSCULAR; INTRAVENOUS at 14:43

## 2020-07-06 RX ADMIN — CIPROFLOXACIN 500 MG: 500 TABLET, FILM COATED ORAL at 08:39

## 2020-07-06 RX ADMIN — CIPROFLOXACIN 500 MG: 500 TABLET, FILM COATED ORAL at 20:53

## 2020-07-06 RX ADMIN — ROCURONIUM BROMIDE 40 MG: 10 INJECTION, SOLUTION INTRAVENOUS at 14:44

## 2020-07-06 RX ADMIN — SUGAMMADEX 400 MG: 100 INJECTION, SOLUTION INTRAVENOUS at 15:12

## 2020-07-06 RX ADMIN — HYDROMORPHONE HYDROCHLORIDE 0.5 MG: 1 INJECTION, SOLUTION INTRAMUSCULAR; INTRAVENOUS; SUBCUTANEOUS at 15:44

## 2020-07-06 RX ADMIN — PROPOFOL 200 MG: 10 INJECTION, EMULSION INTRAVENOUS at 14:44

## 2020-07-06 RX ADMIN — DOCUSATE SODIUM 100 MG: 100 CAPSULE, LIQUID FILLED ORAL at 08:39

## 2020-07-06 RX ADMIN — KETOROLAC TROMETHAMINE 15 MG: 30 INJECTION, SOLUTION INTRAMUSCULAR; INTRAVENOUS at 02:23

## 2020-07-06 ASSESSMENT — PULMONARY FUNCTION TESTS
PIF_VALUE: 4
PIF_VALUE: 1
PIF_VALUE: 0
PIF_VALUE: 27
PIF_VALUE: 2
PIF_VALUE: 32
PIF_VALUE: 29
PIF_VALUE: 1
PIF_VALUE: 29
PIF_VALUE: 28
PIF_VALUE: 27
PIF_VALUE: 30
PIF_VALUE: 25
PIF_VALUE: 31
PIF_VALUE: 31
PIF_VALUE: 30
PIF_VALUE: 33
PIF_VALUE: 3
PIF_VALUE: 31
PIF_VALUE: 28
PIF_VALUE: 29
PIF_VALUE: 28
PIF_VALUE: 29
PIF_VALUE: 31
PIF_VALUE: 29
PIF_VALUE: 29
PIF_VALUE: 31
PIF_VALUE: 28
PIF_VALUE: 31
PIF_VALUE: 31
PIF_VALUE: 33
PIF_VALUE: 31
PIF_VALUE: 1
PIF_VALUE: 31
PIF_VALUE: 31
PIF_VALUE: 30
PIF_VALUE: 0
PIF_VALUE: 2
PIF_VALUE: 35
PIF_VALUE: 29
PIF_VALUE: 29
PIF_VALUE: 0
PIF_VALUE: 31
PIF_VALUE: 31
PIF_VALUE: 0

## 2020-07-06 ASSESSMENT — PAIN DESCRIPTION - PAIN TYPE
TYPE: SURGICAL PAIN

## 2020-07-06 ASSESSMENT — PAIN SCALES - GENERAL
PAINLEVEL_OUTOF10: 1
PAINLEVEL_OUTOF10: 4
PAINLEVEL_OUTOF10: 2
PAINLEVEL_OUTOF10: 5
PAINLEVEL_OUTOF10: 2
PAINLEVEL_OUTOF10: 0
PAINLEVEL_OUTOF10: 4
PAINLEVEL_OUTOF10: 5

## 2020-07-06 NOTE — PLAN OF CARE
Problem: Infection:  Goal: Will remain free from infection  Description: Will remain free from infection  Outcome: Ongoing     Problem: Safety:  Goal: Free from accidental physical injury  Description: Free from accidental physical injury  Outcome: Ongoing  Goal: Free from intentional harm  Description: Free from intentional harm  Outcome: Ongoing     Problem: Pain:  Goal: Patient's pain/discomfort is manageable  Description: Patient's pain/discomfort is manageable  Outcome: Ongoing  Goal: Pain level will decrease  Description: Pain level will decrease  Outcome: Ongoing  Goal: Control of acute pain  Description: Control of acute pain  Outcome: Ongoing  Goal: Control of chronic pain  Description: Control of chronic pain  Outcome: Ongoing

## 2020-07-06 NOTE — PROGRESS NOTES
Shift assessment complete. Pt is alert and oriented. Vital signs are WNL. Respirations are even and easy. No complaints voiced. Pt denies needs at this time. SR up x 2 and bed in low position. Call light is within reach. Will continue to monitor.

## 2020-07-06 NOTE — PROGRESS NOTES
Shift assessment complete - See flowsheet  Scheduled meds given- See MAR exception Lovenox was held due to pt scheduled for surgery. Patient is awake and in bed states she feels a bit nerves but is currently not having any pain at this time. Call light is in reach and no other needs notes at this time. Will continue to monitor.

## 2020-07-06 NOTE — PROGRESS NOTES
Urology Progress Note    Subjective: I still have pain. HPI: Patient has been admitted for renal colic. She had ESWL and a stent placement. P/E  BP (!) 153/83   Pulse 81   Temp 97.5 °F (36.4 °C) (Oral)   Resp 16   Ht 5' 2\" (1.575 m)   Wt 195 lb (88.5 kg)   LMP 06/19/2016   SpO2 98%   BMI 35.67 kg/m²   Skin: Intact  Respiratory: Breathing without difficulty, stable. GI: No acute changes, stable po intake. : right flank pain. MSK: No acute changes, stable. Neuro: No acute changes, stable. Labs  Lab Results   Component Value Date    WBC 9.6 07/04/2020    HGB 12.5 07/04/2020    HCT 37.9 07/04/2020    MCV 88.6 07/04/2020     07/04/2020     06/11/2020     Lab Results   Component Value Date     07/05/2020    K 4.1 07/05/2020     07/05/2020    CO2 26 07/05/2020    BUN 10 07/05/2020    CREATININE 0.8 07/05/2020    CALCIUM 8.0 07/05/2020       Assessment/Plan  Still with pain, possible obstruction. Patient will be taken to surgery today.     Electronically signed by Lennie Yanez MD on 7/6/2020 at 3:20 PM

## 2020-07-06 NOTE — ANESTHESIA PRE PROCEDURE
Department of Anesthesiology  Preprocedure Note       Name:  Skye Miramontes   Age:  63 y.o.  :  1960                                          MRN:  [id number]         Date:  2020      Surgeon: Chris Engel):  Christopher Wright MD    Procedure: Procedure(s):  CYSTOSCOPY, RIGHT URETEROSCOPY, RIGHT STENT EXCHANGE    Medications prior to admission:   Prior to Admission medications    Medication Sig Start Date End Date Taking?  Authorizing Provider   PARoxetine (PAXIL) 20 MG tablet Take 1 tablet by mouth every morning 10/10/19  Yes Em Logan MD   vitamin B-12 (CYANOCOBALAMIN) 1000 MCG tablet Take 1,000 mcg by mouth daily    Historical Provider, MD       Current medications:    Current Facility-Administered Medications   Medication Dose Route Frequency Provider Last Rate Last Dose    ALPRAZolam (XANAX) tablet 0.5 mg  0.5 mg Oral Q6H PRN Linda Marc MD   0.5 mg at 20 1148    docusate sodium (COLACE) capsule 100 mg  100 mg Oral BID Samella Pencil Scott   100 mg at 20 7478    sodium chloride flush 0.9 % injection 10 mL  10 mL Intravenous 2 times per day Vinayak Mackey MD   10 mL at 20 5937    sodium chloride flush 0.9 % injection 10 mL  10 mL Intravenous PRN Vinayak Mackey MD        acetaminophen (TYLENOL) tablet 650 mg  650 mg Oral Q6H PRN Vinayak Mackey MD        Or   [facility] acetaminophen (TYLENOL) suppository 650 mg  650 mg Rectal Q6H PRN Vinayak Mackey MD        polyethylene glycol [facility]) packet 17 g  17 g Oral Daily PRN Vinayak Mackey MD        enoxaparin (LOVENOX) injection 40 mg  40 mg Subcutaneous Daily Vinayak Mackey MD   Stopped at 20 0840    ondansetron (ZOFRAN) injection 4 mg  4 mg Intravenous Q6H PRN Vinayak Mackey MD        0.9 % sodium chloride infusion   Intravenous Continuous Vinayak Mackey  mL/hr at 20 0217      HYDROmorphone (DILAUDID) injection 0.5 mg  0.5 mg Intravenous Q4H PRN Erik Alvarado MD   0.5 mg at 07/05/20 1743    ketorolac (TORADOL) injection 15 mg  15 mg Intravenous Q6H PRN Erik Alvarado MD   15 mg at 07/06/20 0223    PARoxetine (PAXIL) tablet 20 mg  20 mg Oral Daily Erik Alvarado MD   20 mg at 07/06/20 8649    ciprofloxacin (CIPRO) tablet 500 mg  500 mg Oral 2 times per day Erik Alvarado MD   500 mg at 07/06/20 0839    oxyCODONE-acetaminophen (PERCOCET) 5-325 MG per tablet 1 tablet  1 tablet Oral Q6H PRN Erik Alvarado MD   1 tablet at 07/06/20 0967       Allergies: Allergies   Allergen Reactions    Morphine      Nausea      Morphine     Penicillins      Nausea      Penicillins     Prochlorperazine      dystonic    Prochlorperazine Edisylate        Problem List:    Patient Active Problem List   Diagnosis Code    Anxiety F41.9    YRN (obstructive sleep apnea) G47.33    Esophageal reflux K21.9    Hydronephrosis N13.30    Right flank pain R10.9    Right ureteral calculus N20.1       Past Medical History:        Diagnosis Date    Anxiety     Esophageal reflux     Fibromyalgia     GERD (gastroesophageal reflux disease)     Hydronephrosis 7/4/2020    Unspecified sleep apnea     cpap       Past Surgical History:        Procedure Laterality Date    CHOLECYSTECTOMY      CYSTOSCOPY  1/18/2013    HYSTEROSCOPY  2012    TOENAIL AVULSION         Social History:    Social History     Tobacco Use    Smoking status: Never Smoker    Smokeless tobacco: Never Used   Substance Use Topics    Alcohol use:  No                                Counseling given: Not Answered      Vital Signs (Current):   Vitals:    07/05/20 2030 07/06/20 0030 07/06/20 0315 07/06/20 0815   BP: 123/73 (!) 156/86 123/77 (!) 153/83   Pulse: 74 78 75 81   Resp: 16 18 16 16   Temp: 97.4 °F (36.3 °C) 97.1 °F (36.2 °C) 97.5 °F (36.4 °C) 97.5 °F (36.4 °C)   TempSrc: Oral Oral Oral Oral   SpO2: 98% 100% 98% 98%   Weight:       Height: BP Readings from Last 3 Encounters:   07/06/20 (!) 153/83   06/11/20 (!) 145/80   10/10/19 130/82       NPO Status: Time of last liquid consumption: 2200                        Time of last solid consumption: 2200                        Date of last liquid consumption: 07/06/20                        Date of last solid food consumption: 07/06/20    BMI:   Wt Readings from Last 3 Encounters:   07/04/20 195 lb (88.5 kg)   06/11/20 197 lb (89.4 kg)   10/10/19 190 lb 9.6 oz (86.5 kg)     Body mass index is 35.67 kg/m².     CBC:   Lab Results   Component Value Date    WBC 9.6 07/04/2020    RBC 4.28 07/04/2020    HGB 12.5 07/04/2020    HCT 37.9 07/04/2020    MCV 88.6 07/04/2020    RDW 13.9 06/11/2020     07/04/2020     06/11/2020       CMP:   Lab Results   Component Value Date     07/05/2020    K 4.1 07/05/2020     07/05/2020    CO2 26 07/05/2020    BUN 10 07/05/2020    CREATININE 0.8 07/05/2020    GFRAA >60 07/05/2020    GFRAA >60 03/21/2012    AGRATIO 0.9 07/04/2020    LABGLOM >60 07/05/2020    GLUCOSE 105 07/05/2020    PROT 6.9 07/04/2020    PROT 7.2 03/21/2012    CALCIUM 8.0 07/05/2020    BILITOT 0.4 07/04/2020    ALKPHOS 105 07/04/2020    AST 20 07/04/2020    ALT 27 07/04/2020       POC Tests:   Recent Labs     07/04/20  0018   POCGLU 99       Coags:   Lab Results   Component Value Date    INR 0.97 07/03/2020    APTT 26.6 07/03/2020       HCG (If Applicable): No results found for: PREGTESTUR, PREGSERUM, HCG, HCGQUANT     ABGs: No results found for: PHART, PO2ART, QXI4LIO, BYV8IKR, BEART, U0LTJXVL     Type & Screen (If Applicable):  No results found for: LABABO, LABRH    Drug/Infectious Status (If Applicable):  No results found for: HIV, HEPCAB    COVID-19 Screening (If Applicable): No results found for: COVID19      Anesthesia Evaluation   no history of anesthetic complications:   Airway: Mallampati: II  TM distance: <3 FB   Neck ROM: limited  Mouth opening: > = 3 FB Dental: normal exam         Pulmonary:   (+) sleep apnea:                             Cardiovascular:Negative CV ROS                      Neuro/Psych:   (+) neuromuscular disease:,             GI/Hepatic/Renal:   (+) GERD:, renal disease: kidney stones,           Endo/Other: Negative Endo/Other ROS                    Abdominal:           Vascular: negative vascular ROS. Anesthesia Plan      general     ASA 3     (Pt agrees to risks, benefits and alternatives of GETA. Questions answered. Willing to proceed with plan.)  Induction: intravenous. Anesthetic plan and risks discussed with patient.                       Ryder Guajardo MD   7/6/2020

## 2020-07-06 NOTE — BRIEF OP NOTE
Brief Postoperative Note      Patient: Kimberlyn Joe  YOB: 1960  MRN: [id number]    Date of Procedure: 7/6/2020    Pre-Op Diagnosis: STONE, HYDRO, RENAL COLIC    Post-Op Diagnosis: Same       Procedure(s):  CYSTOSCOPY, RIGHT URETEROSCOPY, RIGHT RETROGRADE PYELOGRAM, RIGHT STENT EXCHANGE, STONE EXTRACTION    Surgeon(s):  Anali Pond MD    Assistant:  * No surgical staff found *    Anesthesia: General    Estimated Blood Loss (mL): Minimal    Complications: None    Specimens:   ID Type Source Tests Collected by Time Destination   A : RIGHT URETERAL CALCULI Stone (Calculus) Tissue SURGICAL PATHOLOGY Anali Pond MD 7/6/2020 1506        Implants:  Implant Name Type Inv. Item Serial No.  Lot No. LRB No. Used Action   STENT URET HYDRPL COAT W/O [id number] Stent:Urological STENT URET HYDRPL COAT W/O [id number]  [employer]: [employer] [id number] Right 1 Implanted         Drains: * No LDAs found *    Findings: Multiple distal stones  PLAN:  Patient can be discharged when stable, f/u in the office in 8-10 days. She will need a cysto and stent removal in 2-3 weeks.     Electronically signed by Osmany Zurita MD on 7/6/2020 at 3:22 PM

## 2020-07-06 NOTE — PROGRESS NOTES
Pt RAC IV infiltrated and removed; clinical replaced IV with #20 catheter in LFA with ultrasound guidance. Pt tolerated well.

## 2020-07-06 NOTE — PLAN OF CARE
Care plan ongoing    Problem: Pain:  Goal: Patient's pain/discomfort is manageable  Description: Patient's pain/discomfort is manageable  7/5/2020 2131 by Emma Helton RN  Outcome: Ongoing  7/5/2020 1721 by Mariaelena Hernandez RN  Outcome: Ongoing  7/5/2020 1416 by Mariaelena Hernandez RN  Outcome: Ongoing     Problem: Pain:  Goal: Control of acute pain  Description: Control of acute pain  7/5/2020 2131 by Emma Helton RN  Outcome: Ongoing  7/5/2020 1721 by Mariaelena Hernandez RN  Outcome: Ongoing  7/5/2020 1416 by Mariaelena Hernandez RN  Outcome: Ongoing

## 2020-07-06 NOTE — PROGRESS NOTES
IM Progress Note    Admit Date:  7/4/2020  2    Interval history:  No fevers      Subjective:  Ms. Schaeffer reports persistent pain in right flank. No hematuria  Pain meds helping    Objective:   BP (!) 153/83   Pulse 81   Temp 97.5 °F (36.4 °C) (Oral)   Resp 16   Ht 5' 2\" (1.575 m)   Wt 195 lb (88.5 kg)   LMP 06/19/2016   SpO2 98%   BMI 35.67 kg/m²       Intake/Output Summary (Last 24 hours) at 7/6/2020 1005  Last data filed at 7/5/2020 1920  Gross per 24 hour   Intake 1553 ml   Output --   Net 1553 ml       Physical Exam:    General:  Awake, alert and oriented. Appears to be not in any distress  Mucous Membranes:  Pink , anicteric  Neck: No JVD, no carotid bruit, no thyromegaly  Chest:  Clear to auscultation bilaterally, no added sounds  Cardiovascular:  RRR S1S2 heard, no murmurs or gallops  Abdomen:  Soft, undistended, right flank pain and right cva tenderness , no organomegaly, BS present  Extremities: No edema or cyanosis. Distal pulses well felt  Neurological : grossly normal    Medications:   Scheduled Medications:    docusate sodium  100 mg Oral BID    sodium chloride flush  10 mL Intravenous 2 times per day    enoxaparin  40 mg Subcutaneous Daily    PARoxetine  20 mg Oral Daily    ciprofloxacin  500 mg Oral 2 times per day     I   sodium chloride 100 mL/hr at 07/06/20 0217     sodium chloride flush, acetaminophen **OR** acetaminophen, polyethylene glycol, ondansetron, HYDROmorphone, ketorolac, oxyCODONE-acetaminophen    Lab Data:  Recent Labs     07/04/20  0018   WBC 9.6   HGB 12.5   HCT 37.9   MCV 88.6        Recent Labs     07/04/20  0018 07/05/20  0627    141   K 3.7 4.1    106   CO2 25 26   BUN 15 10   CREATININE 1.0 0.8     No results for input(s): CKTOTAL, CKMB, CKMBINDEX, TROPONINI in the last 72 hours.     Coagulation:   Lab Results   Component Value Date    INR 0.97 07/03/2020    APTT 26.6 07/03/2020     Cardiac markers: No results found for: CKMB, CKTOTAL, TROPONINI, MYOGLOBIN      Lab Results   Component Value Date    ALT 27 07/04/2020    AST 20 07/04/2020    ALKPHOS 105 07/04/2020    BILITOT 0.4 07/04/2020       Lab Results   Component Value Date    INR 0.97 (L) 07/03/2020       Radiology    CULTURES  None      EKG:    No EKG from this admission for review     RADIOLOGY  No orders to display      Pertinent previous results reviewed   CT A/P (From [facility])   IMPRESSION:   1. Right double-J ureteral stent in place, with interval worsening of the   right hydronephrosis, now mild to moderate in degree.  Stent may be   nonfunctioning given the worsening hydronephrosis   2. Multiple stone fragments seen adjacent to the mid and distal portions of   the ureteral stent, and within the right pelvocaliceal system. .   3. Small amount of free fluid seen within the cul-de-sac            ASSESSMENT/PLAN:     R hydronephrosis   Renal calculi  Flank pain  - worsening since ureteral stent placement on Monday concerning for possible non-functioning stent  - retained fragments from stone from lithotripsy on Monday as well, one as large as 7mm  - Urology consulted   - PRN pain and symptom control   Ureteroscopy today     UTI  - recently dx at [facility]   - Will attempt to get urine cx results   - Continue Cipro for now      YRN  - home CPAP     DVT Prophylaxis: Lovenox   Diet: Diet NPO, After Midnight  Code Status: Full Code    ambulate    Yasmine Regalado MD 7/6/2020 10:05 AM

## 2020-07-07 VITALS
TEMPERATURE: 97.5 F | SYSTOLIC BLOOD PRESSURE: 135 MMHG | BODY MASS INDEX: 35.88 KG/M2 | HEART RATE: 82 BPM | RESPIRATION RATE: 16 BRPM | OXYGEN SATURATION: 97 % | WEIGHT: 195 LBS | HEIGHT: 62 IN | DIASTOLIC BLOOD PRESSURE: 81 MMHG

## 2020-07-07 PROCEDURE — 6370000000 HC RX 637 (ALT 250 FOR IP): Performed by: INTERNAL MEDICINE

## 2020-07-07 PROCEDURE — 99238 HOSP IP/OBS DSCHRG MGMT 30/<: CPT | Performed by: INTERNAL MEDICINE

## 2020-07-07 PROCEDURE — 6370000000 HC RX 637 (ALT 250 FOR IP): Performed by: UROLOGY

## 2020-07-07 PROCEDURE — 6360000002 HC RX W HCPCS: Performed by: INTERNAL MEDICINE

## 2020-07-07 PROCEDURE — 2580000003 HC RX 258: Performed by: INTERNAL MEDICINE

## 2020-07-07 RX ORDER — CALCIUM CARBONATE 200(500)MG
500 TABLET,CHEWABLE ORAL 3 TIMES DAILY PRN
Status: DISCONTINUED | OUTPATIENT
Start: 2020-07-07 | End: 2020-07-07 | Stop reason: HOSPADM

## 2020-07-07 RX ORDER — CIPROFLOXACIN 500 MG/1
500 TABLET, FILM COATED ORAL EVERY 12 HOURS SCHEDULED
Qty: 8 TABLET | Refills: 0 | Status: SHIPPED | OUTPATIENT
Start: 2020-07-07 | End: 2020-07-11

## 2020-07-07 RX ADMIN — CALCIUM CARBONATE 500 MG: 500 TABLET, CHEWABLE ORAL at 09:21

## 2020-07-07 RX ADMIN — DOCUSATE SODIUM 100 MG: 100 CAPSULE, LIQUID FILLED ORAL at 09:22

## 2020-07-07 RX ADMIN — PAROXETINE HYDROCHLORIDE 20 MG: 20 TABLET, FILM COATED ORAL at 09:22

## 2020-07-07 RX ADMIN — ACETAMINOPHEN 650 MG: 325 TABLET ORAL at 03:21

## 2020-07-07 RX ADMIN — ONDANSETRON HYDROCHLORIDE 4 MG: 2 INJECTION, SOLUTION INTRAMUSCULAR; INTRAVENOUS at 00:42

## 2020-07-07 RX ADMIN — ONDANSETRON HYDROCHLORIDE 4 MG: 2 INJECTION, SOLUTION INTRAMUSCULAR; INTRAVENOUS at 07:17

## 2020-07-07 RX ADMIN — SODIUM CHLORIDE: 9 INJECTION, SOLUTION INTRAVENOUS at 00:47

## 2020-07-07 RX ADMIN — SODIUM CHLORIDE: 9 INJECTION, SOLUTION INTRAVENOUS at 07:17

## 2020-07-07 RX ADMIN — CIPROFLOXACIN 500 MG: 500 TABLET, FILM COATED ORAL at 09:22

## 2020-07-07 RX ADMIN — ALPRAZOLAM 0.5 MG: 0.5 TABLET ORAL at 05:06

## 2020-07-07 RX ADMIN — ENOXAPARIN SODIUM 40 MG: 40 INJECTION SUBCUTANEOUS at 09:22

## 2020-07-07 ASSESSMENT — PAIN SCALES - GENERAL
PAINLEVEL_OUTOF10: 3
PAINLEVEL_OUTOF10: 2

## 2020-07-07 NOTE — PLAN OF CARE
Problem: Infection:  Goal: Will remain free from infection  Description: Will remain free from infection  0/6/4212 4877 by Flora Fox RN  Outcome: Ongoing  7/7/2020 0314 by Dalton Todd RN  Outcome: Ongoing     Problem: Safety:  Goal: Free from accidental physical injury  Description: Free from accidental physical injury  2/4/9068 0476 by Flora Fox RN  Outcome: Ongoing  7/7/2020 0314 by Dalton Todd RN  Outcome: Ongoing  Goal: Free from intentional harm  Description: Free from intentional harm  3/1/9070 1621 by Flora Fox RN  Outcome: Ongoing  7/7/2020 0314 by Dalton Todd RN  Outcome: Ongoing     Problem: Pain:  Goal: Patient's pain/discomfort is manageable  Description: Patient's pain/discomfort is manageable  8/1/0544 1009 by Flora Fox RN  Outcome: Ongoing  7/7/2020 0314 by Dalton Todd RN  Outcome: Ongoing  Goal: Pain level will decrease  Description: Pain level will decrease  0/3/4504 1506 by Flora Fox RN  Outcome: Ongoing  7/7/2020 0314 by Dalton Todd RN  Outcome: Ongoing  Goal: Control of acute pain  Description: Control of acute pain  8/3/6158 3640 by Flora Fox RN  Outcome: Ongoing  7/7/2020 0314 by Dalton Todd RN  Outcome: Ongoing  Goal: Control of chronic pain  Description: Control of chronic pain  4/3/1308 8539 by Flora Fox RN  Outcome: Ongoing  7/7/2020 0314 by Dalton Todd RN  Outcome: Ongoing

## 2020-07-07 NOTE — OP NOTE
[address]                 [address] [address]                                OPERATIVE REPORT    PATIENT NAME: Chacha Banda                        :        1960  MED REC NO:   [id number]                          ROOM:       0386  ACCOUNT NO:   [de-identified]                           ADMIT DATE: 2020  PROVIDER:     Maryann Rangel MD    DATE OF PROCEDURE:  2020    PREOPERATIVE DIAGNOSIS:  Right flank pain, right hydronephrosis, right  kidney stones. POSTOPERATIVE DIAGNOSIS:  Right flank pain, right hydronephrosis, right  kidney stones. OPERATION PERFORMED:  1. Cystoscopy with right ureteroscopy and multiple stone extraction. 2.  Cystoscopy with stent removal and stent replacement, 4.8 x 22  double-J. PRIMARY SURGEON:  Maryann Rangel MD    ANESTHESIA:  General.    ESTIMATED BLOOD LOSS:  10 mL. OPERATIVE FINDINGS:  _____ of distal ureteral stones in right ureter. HISTORY AND INDICATIONS:  This is a 76-year-old white female who  initially had presented with a large kidney stone and had undergone a  cystoscopy with stent placement and ESWL. She had re-presented to _____  [facility] with worsening pain and was transferred to [facility].  Her CT scan had revealed worsening hydronephrosis with  the possibility of an occluded stent. Urologic consultation was  obtained and as the patient was continuing to have issues and pain, she  was scheduled on an urgent basis for today's case. Risks, benefits, and  expected outcomes of the procedure have been discussed. PROCEDURE IN DETAIL:  After obtaining informed consent, the patient was  taken to the operative suite. She was given a general anesthetic and  intravenous antibiotics. Once adequately anesthetized, she was placed  on the operative table in a modified dorsal lithotomy position. Prepping and draping was done in a sterile fashion. Cystourethroscopy  was then performed. The indwelling ureteral stent was noted. This was  grasped and removed without any difficulty. A wire was then placed  followed by passage of the scope. Multiple stones were identified in  the distal ureter consistent with a _____ of obstruction. Using a  [employer], these were subsequently extracted and placed into the  patient's bladder. Repeat ureteroscopy showed that there was  significant edema and erythema around the distal ureter. A pullout  retrograde also showed persistent obstruction and for this reason, I  decided to place another double-J stent. Her original stent was causing  her trouble and pain and was a 6 x 26 double-J. I elected to place a  4.8 x 22 double-J and there was an adequate curl within the patient's  renal pelvis as well as the bladder under fluoroscopic guidance. The  bladder was irrigated off all stones and these were sent for pathologic  analysis. Overall, the patient tolerated the procedure well. After  draining her bladder, lidocaine jelly was applied, and she was taken  back to the postoperative recovery room in stable condition.         Giorgio Gorman MD    D: 07/06/2020 20:26:07       T: 07/06/2020 20:29:54     /[email or web address]  Job#: [id number]     Doc#: [id number]    CC:

## 2020-07-07 NOTE — DISCHARGE SUMMARY
Name:  Miranda Arciniega  Room:  0219/0219-01  MRN:    [id number]    Discharge Summary      This discharge summary is in conjunction with a complete physical exam done on the day of discharge. Discharging Physician: Dr. Chris Calderonow: 7/4/2020  Discharge:  7/7/2020    HPI taken from admission H&P:    The patient is a 63 y.o. female with anxiety, GERD, fibromyalgia, YRN and recent renal stone s/p double J ureteral stent placement and lithotripsy on 6/29 who presented to [facility] ED with complaint of worsening abdominal pain and malaise. She reports that she was doing well despite hematuria after her procedure but developed some urinary symptoms and went to the ER at [facility] on Thursday. She reports she was dx with a UTI and was started on Cipro. She states that she has been compliant with her medications, but her pain continued and she developed N/V and returned to [facility] ER again. She had a CT scan which showed worsening hydronephrosis with perinephritic stranding and concerns for possible stent failure and was transferred to [facility] for urology consult. She denies any CP, cough, SOB or fevers at home.      Diagnoses this Admission and Hospital Course   R hydronephrosis   Renal calculi  Flank pain  - worsening since ureteral stent placement on Monday concerning for possible non-functioning stent  - retained fragments from stone from lithotripsy on Monday as well, one as large as 7mm  - Urology consulted   - PRN pain and symptom control   - s/p Cystoscopy with right ureteroscopy and multiple stone extraction with stent removal and stent replacement, 4.8 x 22  double-J done 7/6  - Had fever after procedure, now resolved      UTI  - recently dx at [facility]   - Will attempt to get urine cx results - no cultures sent from [address] [place] [address] for now      YRN  - home CPAP    Procedures (Please Review Full Report for Details)  Cystoscopy with R ureteroscopy and multiple stone extraction with stent removal and replacement     Consults    Urology    Physical Exam at Discharge:    /81   Pulse 82   Temp 97.5 °F (36.4 °C) (Oral)   Resp 16   Ht 5' 2\" (1.575 m)   Wt 195 lb (88.5 kg)   LMP 06/19/2016   SpO2 97%   BMI 35.67 kg/m²     General:  Awake, alert and oriented. Appears to be not in any distress  Mucous Membranes:  Pink , anicteric  Neck: No JVD, no carotid bruit, no thyromegaly  Chest:  Clear to auscultation bilaterally, no added sounds  Cardiovascular:  RRR S1S2 heard, no murmurs or gallops  Abdomen:  Soft, undistended, right flank pain and right cva tenderness , no organomegaly, BS present  Extremities: No edema or cyanosis. Distal pulses well felt  Neurological : grossly normal  BMP:   Recent Labs     07/05/20  0627      K 4.1      CO2 26   BUN 10   CREATININE 0.8     CULTURES  None     RADIOLOGY  FL RETROGRADE PYELOGRAM RIGHT   Final Result   Intraprocedural fluoroscopic spot images as above. See separate procedure   report for more information. Discharge Medications     Medication List      START taking these medications    ciprofloxacin 500 MG tablet  Commonly known as:  CIPRO  Take 1 tablet by mouth every 12 hours for 4 days        CONTINUE taking these medications    PARoxetine 20 MG tablet  Commonly known as:  PAXIL  Take 1 tablet by mouth every morning     vitamin B-12 1000 MCG tablet  Commonly known as:  CYANOCOBALAMIN           Where to Get Your Medications      These medications were sent to [address] [place] [address]    Phone:  [phone]   · ciprofloxacin 500 MG tablet       Discharged in stable condition to home    Follow Up: Follow up with PCP, urology OP    ISRRAEL Vega M.D.

## 2020-07-07 NOTE — DISCHARGE SUMMARY
Name:  Garett Singh  Room:  0219/0219-01  MRN:    [id number]    Discharge Summary      This discharge summary is in conjunction with a complete physical exam done on the day of discharge. Discharging Physician: Dr. Danisha Messer: 7/4/2020  Discharge:  7/7/2020    HPI taken from admission H&P:    The patient is a 63 y.o. female with anxiety, GERD, fibromyalgia, YRN and recent renal stone s/p double J ureteral stent placement and lithotripsy on 6/29 who presented to [facility] ED with complaint of worsening abdominal pain and malaise. She reports that she was doing well despite hematuria after her procedure but developed some urinary symptoms and went to the ER at [facility] on Thursday. She reports she was dx with a UTI and was started on Cipro. She states that she has been compliant with her medications, but her pain continued and she developed N/V and returned to [facility] ER again. She had a CT scan which showed worsening hydronephrosis with perinephritic stranding and concerns for possible stent failure and was transferred to [facility] for urology consult. She denies any CP, cough, SOB or fevers at home.      Diagnoses this Admission and Hospital Course   R hydronephrosis   Renal calculi  Flank pain  - worsening since ureteral stent placement on Monday concerning for possible non-functioning stent  - retained fragments from stone from lithotripsy on Monday as well, one as large as 7mm  - Urology consulted   - PRN pain and symptom control   - s/p Cystoscopy with right ureteroscopy and multiple stone extraction with stent removal and stent replacement, 4.8 x 22  double-J done 7/6  - Had fever after procedure, now resolved      UTI  - recently dx at [facility]   - Will attempt to get urine cx results - no cultures sent from [address] [place] [address] for now      YRN  - home CPAP    Procedures (Please Review Full Report for Details)  Cystoscopy with R ureteroscopy and multiple stone extraction with stent

## 2020-07-07 NOTE — PLAN OF CARE
Care plan ongoing    Problem: Pain:  Goal: Patient's pain/discomfort is manageable  Description: Patient's pain/discomfort is manageable  7/7/2020 0314 by Anel Moctezuma RN  Outcome: Ongoing  9/8/5598 4445 by Nicole Morris RN  Outcome: Ongoing     Problem: Safety:  Goal: Free from accidental physical injury  Description: Free from accidental physical injury  7/7/2020 0314 by Anel Moctezuma RN  Outcome: Ongoing  7/8/3695 6542 by Nicole Morris RN  Outcome: Ongoing

## 2020-07-07 NOTE — PROGRESS NOTES
Discharge instructions complete and IV was removed. Pt was provided information on S&S of infection. No further need noted.  is at bedside and is here to transport patient home.

## 2020-07-07 NOTE — PROGRESS NOTES
Per Dr. Wilkinson [facility] records department was called requesting a copy of a urine culture; per Esha there was no culture done. Dr. Xavier Kauffman. Notified.

## 2020-07-07 NOTE — PROGRESS NOTES
Results   Component Value Date    ALT 27 07/04/2020    AST 20 07/04/2020    ALKPHOS 105 07/04/2020    BILITOT 0.4 07/04/2020       Lab Results   Component Value Date    INR 0.97 (L) 07/03/2020       Radiology    CULTURES  None      EKG:    No EKG from this admission for review     RADIOLOGY  No orders to display      Pertinent previous results reviewed   CT A/P (From [facility])   IMPRESSION:   1. Right double-J ureteral stent in place, with interval worsening of the   right hydronephrosis, now mild to moderate in degree.  Stent may be   nonfunctioning given the worsening hydronephrosis   2. Multiple stone fragments seen adjacent to the mid and distal portions of   the ureteral stent, and within the right pelvocaliceal system. .   3. Small amount of free fluid seen within the cul-de-sac            ASSESSMENT/PLAN:     R hydronephrosis   Renal calculi  Flank pain  - worsening since ureteral stent placement on Monday concerning for possible non-functioning stent  - retained fragments from stone from lithotripsy on Monday as well, one as large as 7mm  - Urology consulted   - PRN pain and symptom control   1. Cystoscopy with right ureteroscopy and multiple stone extraction.   2.  Cystoscopy with stent removal and stent replacement, 4.8 x 22  double-J done 7/6  Had fever last night  No fever since      UTI  - recently dx at [facility]   - Will attempt to get urine cx results - no cultures sent from [address] [place] [address] for now      YRN  - home CPAP     DVT Prophylaxis: Lovenox   Diet: general.  Code Status: Full Code    D/c home     Yassine Pace MD 7/7/2020 1:45 PM

## 2020-07-07 NOTE — PROGRESS NOTES
Shift assessment complete. Pt is alert and oriented. Pt has been experiencing some slight hypertension since back from surgery. Pt is still drowsy from anesthesia but rouses easily. Respirations are even and easy. No complaints voiced. Pt denies needs at this time. SR up x 2 and bed in low position. Call light is within reach. Will continue to monitor.

## 2020-07-07 NOTE — PROGRESS NOTES
Per Dr. Kashmir Sanches ok to discharge from nephrology end. Patient expected to follow-up in his office in one week.

## 2020-07-10 LAB
CALCULI COMPOSITION: NORMAL
MASS: 152 MG
STONE DESCRIPTION: NORMAL
STONE NUMBER: NORMAL
STONE SIZE: NORMAL MM

## 2020-07-13 ENCOUNTER — HOSPITAL ENCOUNTER (OUTPATIENT)
Age: 60
Discharge: HOME | End: 2020-07-13
Payer: OTHER GOVERNMENT

## 2020-07-13 LAB — SARS-COV-2, NAAT: NOT DETECTED

## 2020-07-13 PROCEDURE — U0002 COVID-19 LAB TEST NON-CDC: HCPCS

## 2020-07-14 ENCOUNTER — ANESTHESIA EVENT (OUTPATIENT)
Dept: OPERATING ROOM | Age: 60
End: 2020-07-14

## 2020-07-15 ENCOUNTER — HOSPITAL ENCOUNTER (OUTPATIENT)
Age: 60
Setting detail: OUTPATIENT SURGERY
Discharge: HOME | End: 2020-07-15
Attending: UROLOGY | Admitting: UROLOGY

## 2020-07-15 ENCOUNTER — ANESTHESIA (OUTPATIENT)
Dept: OPERATING ROOM | Age: 60
End: 2020-07-15

## 2020-07-15 VITALS
TEMPERATURE: 98.2 F | RESPIRATION RATE: 55 BRPM | SYSTOLIC BLOOD PRESSURE: 120 MMHG | DIASTOLIC BLOOD PRESSURE: 66 MMHG | OXYGEN SATURATION: 89 %

## 2020-07-15 VITALS
RESPIRATION RATE: 16 BRPM | OXYGEN SATURATION: 100 % | BODY MASS INDEX: 35.88 KG/M2 | TEMPERATURE: 97.6 F | DIASTOLIC BLOOD PRESSURE: 77 MMHG | WEIGHT: 195 LBS | SYSTOLIC BLOOD PRESSURE: 134 MMHG | HEART RATE: 66 BPM | HEIGHT: 62 IN

## 2020-07-15 PROCEDURE — 6370000000 HC RX 637 (ALT 250 FOR IP): Performed by: UROLOGY

## 2020-07-15 PROCEDURE — 6360000002 HC RX W HCPCS: Performed by: NURSE ANESTHETIST, CERTIFIED REGISTERED

## 2020-07-15 PROCEDURE — 7100000011 HC PHASE II RECOVERY - ADDTL 15 MIN: Performed by: UROLOGY

## 2020-07-15 PROCEDURE — 3600000004 HC SURGERY LEVEL 4 BASE: Performed by: UROLOGY

## 2020-07-15 PROCEDURE — 2500000003 HC RX 250 WO HCPCS: Performed by: ANESTHESIOLOGY

## 2020-07-15 PROCEDURE — 3700000000 HC ANESTHESIA ATTENDED CARE: Performed by: UROLOGY

## 2020-07-15 PROCEDURE — 6360000002 HC RX W HCPCS: Performed by: UROLOGY

## 2020-07-15 PROCEDURE — 3700000001 HC ADD 15 MINUTES (ANESTHESIA): Performed by: UROLOGY

## 2020-07-15 PROCEDURE — 2580000003 HC RX 258: Performed by: ANESTHESIOLOGY

## 2020-07-15 PROCEDURE — 3600000014 HC SURGERY LEVEL 4 ADDTL 15MIN: Performed by: UROLOGY

## 2020-07-15 PROCEDURE — 2580000003 HC RX 258: Performed by: UROLOGY

## 2020-07-15 PROCEDURE — 7100000010 HC PHASE II RECOVERY - FIRST 15 MIN: Performed by: UROLOGY

## 2020-07-15 PROCEDURE — 2500000003 HC RX 250 WO HCPCS: Performed by: NURSE ANESTHETIST, CERTIFIED REGISTERED

## 2020-07-15 PROCEDURE — 2709999900 HC NON-CHARGEABLE SUPPLY: Performed by: UROLOGY

## 2020-07-15 RX ORDER — LEVOFLOXACIN 5 MG/ML
500 INJECTION, SOLUTION INTRAVENOUS ONCE
Status: COMPLETED | OUTPATIENT
Start: 2020-07-15 | End: 2020-07-15

## 2020-07-15 RX ORDER — SULFAMETHOXAZOLE AND TRIMETHOPRIM 400; 80 MG/1; MG/1
1 TABLET ORAL 2 TIMES DAILY
Qty: 8 TABLET | Refills: 0 | Status: SHIPPED | OUTPATIENT
Start: 2020-07-15 | End: 2020-07-19

## 2020-07-15 RX ORDER — MAGNESIUM HYDROXIDE 1200 MG/15ML
LIQUID ORAL PRN
Status: DISCONTINUED | OUTPATIENT
Start: 2020-07-15 | End: 2020-07-15 | Stop reason: ALTCHOICE

## 2020-07-15 RX ORDER — OXYCODONE HYDROCHLORIDE AND ACETAMINOPHEN 5; 325 MG/1; MG/1
1 TABLET ORAL PRN
Status: DISCONTINUED | OUTPATIENT
Start: 2020-07-15 | End: 2020-07-15 | Stop reason: HOSPADM

## 2020-07-15 RX ORDER — HYDRALAZINE HYDROCHLORIDE 20 MG/ML
5 INJECTION INTRAMUSCULAR; INTRAVENOUS EVERY 10 MIN PRN
Status: DISCONTINUED | OUTPATIENT
Start: 2020-07-15 | End: 2020-07-15 | Stop reason: HOSPADM

## 2020-07-15 RX ORDER — LIDOCAINE HYDROCHLORIDE 20 MG/ML
INJECTION, SOLUTION INFILTRATION; PERINEURAL PRN
Status: DISCONTINUED | OUTPATIENT
Start: 2020-07-15 | End: 2020-07-15 | Stop reason: SDUPTHER

## 2020-07-15 RX ORDER — LABETALOL HYDROCHLORIDE 5 MG/ML
5 INJECTION, SOLUTION INTRAVENOUS EVERY 10 MIN PRN
Status: DISCONTINUED | OUTPATIENT
Start: 2020-07-15 | End: 2020-07-15 | Stop reason: HOSPADM

## 2020-07-15 RX ORDER — OXYCODONE HYDROCHLORIDE AND ACETAMINOPHEN 5; 325 MG/1; MG/1
2 TABLET ORAL PRN
Status: DISCONTINUED | OUTPATIENT
Start: 2020-07-15 | End: 2020-07-15 | Stop reason: HOSPADM

## 2020-07-15 RX ORDER — PROPOFOL 10 MG/ML
INJECTION, EMULSION INTRAVENOUS PRN
Status: DISCONTINUED | OUTPATIENT
Start: 2020-07-15 | End: 2020-07-15 | Stop reason: SDUPTHER

## 2020-07-15 RX ORDER — ONDANSETRON 2 MG/ML
4 INJECTION INTRAMUSCULAR; INTRAVENOUS PRN
Status: DISCONTINUED | OUTPATIENT
Start: 2020-07-15 | End: 2020-07-15 | Stop reason: HOSPADM

## 2020-07-15 RX ORDER — MEPERIDINE HYDROCHLORIDE 25 MG/ML
12.5 INJECTION INTRAMUSCULAR; INTRAVENOUS; SUBCUTANEOUS EVERY 5 MIN PRN
Status: DISCONTINUED | OUTPATIENT
Start: 2020-07-15 | End: 2020-07-15 | Stop reason: HOSPADM

## 2020-07-15 RX ORDER — DIPHENHYDRAMINE HYDROCHLORIDE 50 MG/ML
12.5 INJECTION INTRAMUSCULAR; INTRAVENOUS
Status: DISCONTINUED | OUTPATIENT
Start: 2020-07-15 | End: 2020-07-15 | Stop reason: HOSPADM

## 2020-07-15 RX ORDER — LIDOCAINE HYDROCHLORIDE 10 MG/ML
1 INJECTION, SOLUTION EPIDURAL; INFILTRATION; INTRACAUDAL; PERINEURAL
Status: COMPLETED | OUTPATIENT
Start: 2020-07-15 | End: 2020-07-15

## 2020-07-15 RX ORDER — PHENAZOPYRIDINE HYDROCHLORIDE 200 MG/1
200 TABLET, FILM COATED ORAL 3 TIMES DAILY PRN
Qty: 15 TABLET | Refills: 0 | Status: SHIPPED | OUTPATIENT
Start: 2020-07-15 | End: 2020-07-20

## 2020-07-15 RX ORDER — LIDOCAINE HYDROCHLORIDE 20 MG/ML
JELLY TOPICAL PRN
Status: DISCONTINUED | OUTPATIENT
Start: 2020-07-15 | End: 2020-07-15 | Stop reason: ALTCHOICE

## 2020-07-15 RX ORDER — SODIUM CHLORIDE, SODIUM LACTATE, POTASSIUM CHLORIDE, CALCIUM CHLORIDE 600; 310; 30; 20 MG/100ML; MG/100ML; MG/100ML; MG/100ML
INJECTION, SOLUTION INTRAVENOUS CONTINUOUS
Status: DISCONTINUED | OUTPATIENT
Start: 2020-07-15 | End: 2020-07-15 | Stop reason: HOSPADM

## 2020-07-15 RX ORDER — SODIUM CHLORIDE 0.9 % (FLUSH) 0.9 %
10 SYRINGE (ML) INJECTION PRN
Status: DISCONTINUED | OUTPATIENT
Start: 2020-07-15 | End: 2020-07-15 | Stop reason: HOSPADM

## 2020-07-15 RX ORDER — SODIUM CHLORIDE 0.9 % (FLUSH) 0.9 %
10 SYRINGE (ML) INJECTION EVERY 12 HOURS SCHEDULED
Status: DISCONTINUED | OUTPATIENT
Start: 2020-07-15 | End: 2020-07-15 | Stop reason: HOSPADM

## 2020-07-15 RX ADMIN — LIDOCAINE HYDROCHLORIDE 80 MG: 20 INJECTION, SOLUTION INFILTRATION; PERINEURAL at 14:35

## 2020-07-15 RX ADMIN — SODIUM CHLORIDE, SODIUM LACTATE, POTASSIUM CHLORIDE, AND CALCIUM CHLORIDE: .6; .31; .03; .02 INJECTION, SOLUTION INTRAVENOUS at 13:56

## 2020-07-15 RX ADMIN — PROPOFOL 100 MG: 10 INJECTION, EMULSION INTRAVENOUS at 14:35

## 2020-07-15 RX ADMIN — LIDOCAINE HYDROCHLORIDE 1 ML: 10 INJECTION, SOLUTION EPIDURAL; INFILTRATION; INTRACAUDAL; PERINEURAL at 13:56

## 2020-07-15 RX ADMIN — PROPOFOL 100 MG: 10 INJECTION, EMULSION INTRAVENOUS at 14:40

## 2020-07-15 RX ADMIN — LEVOFLOXACIN 500 MG: 5 INJECTION, SOLUTION INTRAVENOUS at 14:26

## 2020-07-15 ASSESSMENT — PAIN SCALES - GENERAL: PAINLEVEL_OUTOF10: 0

## 2020-07-15 ASSESSMENT — PULMONARY FUNCTION TESTS
PIF_VALUE: 0

## 2020-07-15 ASSESSMENT — PAIN - FUNCTIONAL ASSESSMENT: PAIN_FUNCTIONAL_ASSESSMENT: 0-10

## 2020-07-15 NOTE — PROGRESS NOTES
Discharge instructions given to  per phone.
Pat done. Instructed to call staff if having signs of fever, cough or sob.
surgery. 14. If you have a Living Will and Durable Power of  for Healthcare, please bring in a copy. 13. Notify your Surgeon if you develop any illness between now and surgery  time, cough, cold, fever, sore throat, nausea, vomiting, etc.  Please notify your surgeon if you experience dizziness, shortness of breath or blurred vision between now & the time of your surgery   16. DO NOT shave your operative site 96 hours prior to surgery. For face & neck surgery, men may use an electric razor 48 hours prior to surgery. 17. Shower with _x__Antibacterial soap (x_chlorhexidine for total joint  Pt's) shower two times before surgery.(the morning of and the night before. 18. To provide excellent care visitors will be limited to one in the room at any given time.   Please call pre admission testing if you any further questions [phone] or [phone]

## 2020-07-15 NOTE — ANESTHESIA POSTPROCEDURE EVALUATION
Department of Anesthesiology  Postprocedure Note    Patient: Paulette Silva  MRN: [id number]  YOB: 1960  Date of evaluation: 7/15/2020  Time:  3:57 PM     Procedure Summary     Date:  07/15/20 Room / Location:  hospitals / [facility]    Anesthesia Start:  0130 Anesthesia Stop:  1066    Procedure:  CYSTOSCOPY, RIGHT STENT REMOVAL (Right Bladder) Diagnosis:  (RENAL COLIC)    Surgeon:  Iris Benitez MD Responsible Provider:  Cata Betancur MD    Anesthesia Type:  general ASA Status:  3          Anesthesia Type: general    Ana Phase I: Ana Score: 10    Ana Phase II: Ana Score: 10    Last vitals: Reviewed and per EMR flowsheets.        Anesthesia Post Evaluation    Comments: Postoperative Anesthesia Note    Name:    Paulette Silva  MRN:      [id number]    Patient Vitals in the past 12 hrs:  07/15/20 1535, BP:134/77, Pulse:66, Resp:16  07/15/20 1451, BP:124/73, Temp:97.6 °F (36.4 °C), Temp src:Temporal, Pulse:75, Resp:15, SpO2:100 %  07/15/20 1402, BP:(!) 149/66, Temp:97.1 °F (36.2 °C), Temp src:Temporal, Pulse:66, Resp:16     LABS:    CBC  Lab Results       Component                Value               Date/Time                  WBC                      9.6                 07/04/2020 12:18 AM        HGB                      12.5                07/04/2020 12:18 AM        HCT                      37.9                07/04/2020 12:18 AM        PLT                      238                 07/04/2020 12:18 AM        PLT                      259                 06/11/2020 12:43 PM   RENAL  Lab Results       Component                Value               Date/Time                  NA                       141                 07/05/2020 06:27 AM        K                        4.1                 07/05/2020 06:27 AM        CL                       106                 07/05/2020 06:27 AM        CO2                      26                  07/05/2020 06:27 AM        BUN 10                  07/05/2020 06:27 AM        CREATININE               0.8                 07/05/2020 06:27 AM        GLUCOSE                  105 (H)             07/05/2020 06:27 AM   COAGS  Lab Results       Component                Value               Date/Time                  INR                      0.97 (L)            07/03/2020 12:55 AM        APTT                     26.6                07/03/2020 12:55 AM     Intake & Output:  @33XENY@    Nausea & Vomiting:  No    Level of Consciousness:  Awake    Pain Assessment:  Adequate analgesia    Anesthesia Complications:  No apparent anesthetic complications    SUMMARY      Vital signs stable  OK to discharge from Stage I post anesthesia care.   Care transferred from Anesthesiology department on discharge from perioperative area

## 2020-07-15 NOTE — BRIEF OP NOTE
Brief Postoperative Note      Patient:  Venancio Sr  YOB: 1960  MRN: [id number]    Date of Procedure: 7/15/2020    Pre-Op Diagnosis: RENAL COLIC    Post-Op Diagnosis: Same       Procedure(s):  CYSTOSCOPY, RIGHT STENT REMOVAL    Surgeon(s):  Malcom Marroquin MD    Assistant:  * No surgical staff found *    Anesthesia: General    Estimated Blood Loss (mL): Minimal    Complications: None    Specimens:   * No specimens in log *    Implants:  * No implants in log *      Drains: * No LDAs found *    Findings: Right ureteral stent    Electronically signed by Maggy Jeffery MD on 7/15/2020 at 2:45 PM

## 2020-07-17 NOTE — OP NOTE
[address]                 [address] [address]                                OPERATIVE REPORT    PATIENT NAME: Angelo Lozano                        :        1960  MED REC NO:   [id number]                          ROOM:  ACCOUNT NO:   [de-identified]                           ADMIT DATE: 07/15/2020  PROVIDER:     Boyd Concepcion MD    DATE OF PROCEDURE:  07/15/2020    PREOPERATIVE DIAGNOSIS:  Right hydronephrosis, right indwelling ureteral  stent. POSTOPERATIVE DIAGNOSIS:  Right hydronephrosis, right indwelling  ureteral stent. OPERATION PERFORMED:  Cystourethroscopy with right stent removal.    PRIMARY SURGEON:  Boyd Concepcion MD    ANESTHESIA:  General.    OPERATIVE FINDINGS:  Indwelling right ureteral stent. ESTIMATED BLOOD LOSS:  Zero. HISTORY AND INDICATIONS:  This is a 81-year-old white female who has had  a history of kidney stones and had difficult ureteroscopy, stone  extraction and stent placement. She is now presenting today for  cystoscopy and stent removal.  Risks, benefits and expected outcomes of  the procedure have been discussed. PROCEDURE IN DETAIL:  After obtaining informed consent, the patient was  taken to the operative suite. She was given a general anesthetic and  placed on the operative table in a modified dorsal lithotomy position. Prepping and draping was done in sterile fashion. Cystourethroscopy was  then performed. The indwelling ureteral stent was noted, grasped and  removed without any difficulty. A followup cystoscopy was done, and  there was good efflux from the affected ureteral orifice. No evidence  of any further obstruction was noted. Overall, the patient tolerated  the procedure well. After draining her bladder, lidocaine jelly was  applied, and she was taken back to the postop recovery room in stable  condition.         Kimberly Callaway MD    D: 2020 19:56:59       T: 07/16/2020 20:05:18     [email or web address]  Job#: [id number]     Doc#: [id number]    CC:

## 2020-07-22 ENCOUNTER — HOSPITAL ENCOUNTER (EMERGENCY)
Age: 60
Discharge: HOME | End: 2020-07-23
Attending: EMERGENCY MEDICINE

## 2020-07-22 ENCOUNTER — APPOINTMENT (OUTPATIENT)
Dept: CT IMAGING | Age: 60
End: 2020-07-22

## 2020-07-22 LAB
A/G RATIO: 1.4 (ref 1.1–2.2)
ALBUMIN SERPL-MCNC: 4.6 G/DL (ref 3.4–5)
ALP BLD-CCNC: 122 U/L (ref 40–129)
ALT SERPL-CCNC: 28 U/L (ref 10–40)
ANION GAP SERPL CALCULATED.3IONS-SCNC: 11 MMOL/L (ref 3–16)
AST SERPL-CCNC: 26 U/L (ref 15–37)
BASOPHILS ABSOLUTE: 0.1 K/UL (ref 0–0.2)
BASOPHILS RELATIVE PERCENT: 1.1 %
BILIRUB SERPL-MCNC: <0.2 MG/DL (ref 0–1)
BUN BLDV-MCNC: 18 MG/DL (ref 7–20)
CALCIUM SERPL-MCNC: 9.3 MG/DL (ref 8.3–10.6)
CHLORIDE BLD-SCNC: 98 MMOL/L (ref 99–110)
CO2: 28 MMOL/L (ref 21–32)
CREAT SERPL-MCNC: 0.7 MG/DL (ref 0.6–1.1)
EOSINOPHILS ABSOLUTE: 0.2 K/UL (ref 0–0.6)
EOSINOPHILS RELATIVE PERCENT: 2 %
GFR AFRICAN AMERICAN: >60
GFR NON-AFRICAN AMERICAN: >60
GLOBULIN: 3.2 G/DL
GLUCOSE BLD-MCNC: 94 MG/DL (ref 70–99)
HCT VFR BLD CALC: 40.7 % (ref 36–48)
HEMOGLOBIN: 13.4 G/DL (ref 12–16)
LYMPHOCYTES ABSOLUTE: 2.3 K/UL (ref 1–5.1)
LYMPHOCYTES RELATIVE PERCENT: 25.9 %
MCH RBC QN AUTO: 29.2 PG (ref 26–34)
MCHC RBC AUTO-ENTMCNC: 33 G/DL (ref 31–36)
MCV RBC AUTO: 88.3 FL (ref 80–100)
MONOCYTES ABSOLUTE: 0.7 K/UL (ref 0–1.3)
MONOCYTES RELATIVE PERCENT: 7.3 %
NEUTROPHILS ABSOLUTE: 5.7 K/UL (ref 1.7–7.7)
NEUTROPHILS RELATIVE PERCENT: 63.7 %
PDW BLD-RTO: 14.1 % (ref 12.4–15.4)
PLATELET # BLD: 302 K/UL (ref 135–450)
PMV BLD AUTO: 8 FL (ref 5–10.5)
POTASSIUM REFLEX MAGNESIUM: 3.7 MMOL/L (ref 3.5–5.1)
RBC # BLD: 4.6 M/UL (ref 4–5.2)
SODIUM BLD-SCNC: 137 MMOL/L (ref 136–145)
TOTAL PROTEIN: 7.8 G/DL (ref 6.4–8.2)
WBC # BLD: 9 K/UL (ref 4–11)

## 2020-07-22 PROCEDURE — 6360000002 HC RX W HCPCS: Performed by: EMERGENCY MEDICINE

## 2020-07-22 PROCEDURE — 99284 EMERGENCY DEPT VISIT MOD MDM: CPT

## 2020-07-22 PROCEDURE — 74176 CT ABD & PELVIS W/O CONTRAST: CPT

## 2020-07-22 PROCEDURE — 80053 COMPREHEN METABOLIC PANEL: CPT

## 2020-07-22 PROCEDURE — 85025 COMPLETE CBC W/AUTO DIFF WBC: CPT

## 2020-07-22 PROCEDURE — 81001 URINALYSIS AUTO W/SCOPE: CPT

## 2020-07-22 PROCEDURE — 2500000003 HC RX 250 WO HCPCS: Performed by: EMERGENCY MEDICINE

## 2020-07-22 RX ORDER — ONDANSETRON 2 MG/ML
4 INJECTION INTRAMUSCULAR; INTRAVENOUS
Status: DISCONTINUED | OUTPATIENT
Start: 2020-07-22 | End: 2020-07-23 | Stop reason: HOSPADM

## 2020-07-22 RX ADMIN — ONDANSETRON HYDROCHLORIDE 4 MG: 2 INJECTION, SOLUTION INTRAMUSCULAR; INTRAVENOUS at 23:48

## 2020-07-22 RX ADMIN — HYDROMORPHONE HYDROCHLORIDE 1 MG: 1 INJECTION, SOLUTION INTRAMUSCULAR; INTRAVENOUS; SUBCUTANEOUS at 23:48

## 2020-07-22 ASSESSMENT — PAIN SCALES - GENERAL
PAINLEVEL_OUTOF10: 10
PAINLEVEL_OUTOF10: 10

## 2020-07-23 VITALS
RESPIRATION RATE: 12 BRPM | DIASTOLIC BLOOD PRESSURE: 70 MMHG | OXYGEN SATURATION: 94 % | WEIGHT: 195 LBS | TEMPERATURE: 98.1 F | BODY MASS INDEX: 35.88 KG/M2 | HEIGHT: 62 IN | HEART RATE: 78 BPM | SYSTOLIC BLOOD PRESSURE: 115 MMHG

## 2020-07-23 LAB
BILIRUBIN URINE: NEGATIVE
BLOOD, URINE: ABNORMAL
CLARITY: CLEAR
COLOR: YELLOW
EPITHELIAL CELLS, UA: NORMAL /HPF (ref 0–5)
GLUCOSE URINE: NEGATIVE MG/DL
KETONES, URINE: NEGATIVE MG/DL
LEUKOCYTE ESTERASE, URINE: NEGATIVE
MICROSCOPIC EXAMINATION: YES
NITRITE, URINE: NEGATIVE
PH UA: 6 (ref 5–8)
PROTEIN UA: NEGATIVE MG/DL
RBC UA: NORMAL /HPF (ref 0–4)
SPECIFIC GRAVITY UA: <=1.005 (ref 1–1.03)
URINE REFLEX TO CULTURE: ABNORMAL
URINE TYPE: ABNORMAL
UROBILINOGEN, URINE: 0.2 E.U./DL
WBC UA: NORMAL /HPF (ref 0–5)

## 2020-07-23 ASSESSMENT — PAIN SCALES - GENERAL: PAINLEVEL_OUTOF10: 3

## 2020-07-23 ASSESSMENT — PAIN DESCRIPTION - PAIN TYPE: TYPE: ACUTE PAIN

## 2020-07-23 ASSESSMENT — PAIN DESCRIPTION - ORIENTATION: ORIENTATION: RIGHT

## 2020-07-23 ASSESSMENT — PAIN DESCRIPTION - LOCATION: LOCATION: FLANK

## 2020-07-23 NOTE — ED PROVIDER NOTES
[address] ED      CHIEF COMPLAINT  Flank Pain (pt arrives with flank pain since 2000 tonight. pt recently discharged from hospital last week after they removed stent that was placed for kidney stone. pt reports nausea)       HISTORY OF PRESENT ILLNESS  Venancio Sr is a 63 y.o. female  who presents to the ED complaining of pain radiating to back/flank on the right side. Severe pain like when she had her last stone. Goes into abd and suprapubic area. Recent ureteral stent placed 6/29 with lithotripsy, with subsequent admission for stone removal and stent replacement. BM without issues or diarrhea. Tried to drink fluids. No hematuria. No dysuria. No fever. No vomiting. No CP/dyspnea. Has taken pyridium and 1/2 tab percocet without improvement. Stent was removed last Wednesday. Sees Dr. Kirsten Hogan for her urologic care. No other complaints, modifying factors or associated symptoms. I have reviewed the following from the nursing documentation.     Past Medical History:   Diagnosis Date    Anxiety     Esophageal reflux     Fibromyalgia     GERD (gastroesophageal reflux disease)     Hydronephrosis 7/4/2020    Unspecified sleep apnea     cpap     Past Surgical History:   Procedure Laterality Date    CHOLECYSTECTOMY      CYSTOSCOPY  1/18/2013    CYSTOSCOPY INSERTION / REMOVAL STENT / STONE Right 7/6/2020    CYSTOSCOPY, RIGHT URETEROSCOPY, RIGHT RETROGRADE PYELOGRAM, RIGHT STENT EXCHANGE, STONE EXTRACTION performed by Malcom Marroquin MD at [address] [facility] Right 7/15/2020    CYSTOSCOPY, RIGHT STENT REMOVAL performed by Malcom Marroquin MD at [facility]  2012    LITHOTRIPSY      OTHER SURGICAL HISTORY       CYSTOSCOPY, RIGHT URETEROSCOPY, RIGHT RETROGRADE PYELOGRAM, RIGHT STENT EXCHANGE, STONE EXTRACTION (Right Bladder)    TOENAIL AVULSION       Family History   Problem Relation Age of Onset    Arthritis Mother  Asthma Mother     High Blood Pressure Mother     Heart Disease Sister     Heart Disease Father     Cancer Sister         breast     Social History     Socioeconomic History    Marital status:      Spouse name: Not on file    Number of children: Not on file    Years of education: Not on file    Highest education level: Not on file   Occupational History    Not on file   Social Needs    Financial resource strain: Not on file    Food insecurity     Worry: Not on file     Inability: Not on file   [employer] needs     Medical: Not on file     Non-medical: Not on file   Tobacco Use    Smoking status: Never Smoker    Smokeless tobacco: Never Used   Substance and Sexual Activity    Alcohol use: No    Drug use: No    Sexual activity: Yes   Lifestyle    Physical activity     Days per week: Not on file     Minutes per session: Not on file    Stress: Not on file   Relationships    Social connections     Talks on phone: Not on file     Gets together: Not on file     Attends [employer] service: Not on file     Active member of club or organization: Not on file     Attends meetings of clubs or organizations: Not on file     Relationship status: Not on file    Intimate partner violence     Fear of current or ex partner: Not on file     Emotionally abused: Not on file     Physically abused: Not on file     Forced sexual activity: Not on file   Other Topics Concern    Not on file   Social History Narrative    ** Merged History Encounter **          Current Facility-Administered Medications   Medication Dose Route Frequency Provider Last Rate Last Dose    HYDROmorphone (DILAUDID) injection 1 mg  1 mg Intravenous Q1H PRN Stephanie Balbuena MD   1 mg at 07/22/20 2348    ondansetron (ZOFRAN) injection 4 mg  4 mg Intravenous Q1H PRN Stephanie Balbuena MD   4 mg at 07/22/20 2348     Current Outpatient Medications   Medication Sig Dispense Refill    PARoxetine (PAXIL) 20 MG tablet Take 1 tablet by mouth every morning 90 tablet 3    vitamin B-12 (CYANOCOBALAMIN) 1000 MCG tablet Take 1,000 mcg by mouth daily       Allergies   Allergen Reactions    Morphine      Nausea      Morphine     Penicillins      Nausea      Penicillins     Prochlorperazine      dystonic    Prochlorperazine Edisylate        REVIEW OF SYSTEMS  10 systems reviewed, pertinent positives per HPI otherwise noted to be negative. PHYSICAL EXAM  /70   Pulse 78   Temp 98.1 °F (36.7 °C) (Oral)   Resp 12   Ht 5' 2\" (1.575 m)   Wt 195 lb (88.5 kg)   LMP 06/19/2016   SpO2 94%   BMI 35.67 kg/m²    GENERAL APPEARANCE: Awake and alert. Cooperative. No acute distress. HENT: Normocephalic. Atraumatic. Mucous membranes are moist.  No drooling or stridor. NECK: Supple. EYES: PERRL. EOM's grossly intact. HEART/CHEST: RRR. No murmurs. LUNGS: Respirations unlabored. CTAB. Good air exchange. Speaking comfortably in full sentences. ABDOMEN: RLQ and lateral tenderness. Soft. Non-distended. No masses. No organomegaly. No guarding or rebound. MUSCULOSKELETAL: No extremity edema. Compartments soft. No deformity. No tenderness in the extremities. All extremities neurovascularly intact. SKIN: Warm and dry. No acute rashes. NEUROLOGICAL: Alert and oriented. CN's 2-12 intact. No gross facial drooping. No gross focal deficits. PSYCHIATRIC: Normal mood and affect. LABS  I have reviewed all labs for this visit.    Results for orders placed or performed during the hospital encounter of 07/22/20   CBC auto differential   Result Value Ref Range    WBC 9.0 4.0 - 11.0 K/uL    RBC 4.60 4.00 - 5.20 M/uL    Hemoglobin 13.4 12.0 - 16.0 g/dL    Hematocrit 40.7 36.0 - 48.0 %    MCV 88.3 80.0 - 100.0 fL    MCH 29.2 26.0 - 34.0 pg    MCHC 33.0 31.0 - 36.0 g/dL    RDW 14.1 12.4 - 15.4 %    Platelets 016 923 - 575 K/uL    MPV 8.0 5.0 - 10.5 fL    Neutrophils % 63.7 %    Lymphocytes % 25.9 %    Monocytes % 7.3 %    Eosinophils % 2.0 %    Basophils % Multiplanar reformatted images are provided for review. Dose modulation, iterative reconstruction, and/or weight based adjustment of the mA/kV was utilized to reduce the radiation dose to as low as reasonably achievable. COMPARISON: None. HISTORY: ORDERING SYSTEM PROVIDED HISTORY: flank pain, hx stent and stones TECHNOLOGIST PROVIDED HISTORY: Additional Contrast?->None Reason for exam:->flank pain, hx stent and stones Reason for Exam: right side stent just removed pain FINDINGS: Lower Chest: Clear Organs: Fat containing umbilical hernia. The liver, spleen, pancreas, and adrenals appear normal.  Calcified granulomas in the spleen. Gallbladder surgically absent. Parapelvic cysts are noted on the left. Moderate hydronephrosis is noted on the right as well as hydroureter. No radiodense urolithiasis is noted. The bladder appears normal. GI/Bowel: The stomach,small bowel, and colon appear normal. Appendix normal. Pelvis: Uterus normal. Peritoneum/Retroperitoneum: The abdominal aorta and iliac arteries are normal in caliber. There is no pathologic adenopathy. Bones/Soft Tissues: Normal     Moderate right hydronephrosis but no radiodense urolithiasis identified. ED COURSE/MDM  Patient seen and evaluated. Old records reviewed. Labs and imaging reviewed and results discussed with patient. Patient presenting with recurrent right flank and right lower abdominal discomfort consistent with her pain that she had with her most recent kidney stone. Urinalysis without any evidence of infection. Labs unremarkable including no significant leukocytosis and normal renal function. CT scan was obtained showing moderate hydro-but no radiodense urolithiasis noted. Possibly recently passed stone. Her pain is significantly improved after single dose of pain medicine and after period of observation, patient has not had any recurrent pain.   At this time I felt that is reasonable to discharge patient home with supportive treatment and close follow-up with her urologist.  She states that she was to follow-up in 2 to 3 weeks after removal of her stent is been about a week at this point. She is to call tomorrow to schedule that appointment and perhaps see her urologist sooner given her visit here tonight. Patient was comfortable with that plan, as well as her  who is at bedside. All questions were answered at time of discharge. Strict return precautions also discussed. I estimate there is LOW risk for ACUTE APPENDICITIS, BOWEL OBSTRUCTION, CHOLECYSTITIS, DIVERTICULITIS, INCARCERATED HERNIA, PANCREATITIS, or PERFORATED BOWEL or ULCER, thus I consider the discharge disposition reasonable. Also, there is no evidence or peritonitis, sepsis, or toxicity. Skye Miramontes and I have discussed the diagnosis and risks, and we agree with discharging home to follow-up with their primary doctor. We also discussed returning to the Emergency Department immediately if new or worsening symptoms occur. We have discussed the symptoms which are most concerning (e.g., bloody stool, fever, changing or worsening pain, vomiting) that necessitate immediate return. During the patient's ED course, the patient was given:  Medications   HYDROmorphone (DILAUDID) injection 1 mg (1 mg Intravenous Given 7/22/20 2348)   ondansetron (ZOFRAN) injection 4 mg (4 mg Intravenous Given 7/22/20 2348)        CLINICAL IMPRESSION  1. Right flank pain    2. Hydronephrosis, right        Blood pressure 115/70, pulse 78, temperature 98.1 °F (36.7 °C), temperature source Oral, resp. rate 12, height 5' 2\" (1.575 m), weight 195 lb (88.5 kg), last menstrual period 06/19/2016, SpO2 94 %, not currently breastfeeding. [address] was discharged to home in stable condition. Patient was given scripts for the following medications. I counseled patient how to take these medications.    Discharge Medication List as of 7/23/2020  2:01 AM          Follow-up with:  Ellen Meyer MD  [id number] [facility]  [phone]      As needed    MD Margar[facility]  [facility]  [place] [address]  [phone]    Schedule an appointment as soon as possible for a visit         DISCLAIMER: This chart was created using Dragon dictation software. Efforts were made by me to ensure accuracy, however some errors may be present due to limitations of this technology and occasionally words are not transcribed correctly.         Jose Daniel MD  07/23/20 0256

## 2022-06-01 ENCOUNTER — PATIENT MESSAGE (OUTPATIENT)
Dept: FAMILY MEDICINE CLINIC | Age: 62
End: 2022-06-01

## 2022-06-01 DIAGNOSIS — F41.9 ANXIETY: ICD-10-CM

## 2022-06-01 NOTE — TELEPHONE ENCOUNTER
From: Grecia Mejia  To: Phillip Gonzalez  Sent: 6/1/2022 11:40 AM EDT  Subject: Refill    Hi Anna. Dr. Donato Butler told me to contact you when I needed my prescription refilled. I've been taking this same medicine for over twenty years. He said I can do this instead of an office visit. Please let me know when you call this in or what I need to do.  Thank you so much

## 2022-06-03 RX ORDER — PAROXETINE HYDROCHLORIDE 20 MG/1
20 TABLET, FILM COATED ORAL EVERY MORNING
Qty: 90 TABLET | Refills: 0 | Status: SHIPPED | OUTPATIENT
Start: 2022-06-03 | End: 2022-11-01 | Stop reason: SDUPTHER

## 2022-06-03 NOTE — TELEPHONE ENCOUNTER
Called and spoke with patient. Informed her that we have not filled this in over 2 years and she says that she has really stretched her prescription out due to not having money.   I informed her that she is due for an appointment also and she says she doesn't have the money to come in and her and Dr. Cecilia Burdick discussed this in the past.

## 2022-06-03 NOTE — TELEPHONE ENCOUNTER
Medication:   Requested Prescriptions     Pending Prescriptions Disp Refills    PARoxetine (PAXIL) 20 MG tablet 90 tablet 3     Sig: Take 1 tablet by mouth every morning        Last Filled:  10/10/2019    Patient Phone Number: [phone] (home)     Last appt: 06/11/2020  Next appt: Visit date not found    Last OARRS: No flowsheet data found.

## 2022-11-01 ENCOUNTER — OFFICE VISIT (OUTPATIENT)
Dept: FAMILY MEDICINE CLINIC | Age: 62
End: 2022-11-01
Payer: COMMERCIAL

## 2022-11-01 VITALS
BODY MASS INDEX: 33.27 KG/M2 | SYSTOLIC BLOOD PRESSURE: 134 MMHG | HEART RATE: 88 BPM | WEIGHT: 180.8 LBS | OXYGEN SATURATION: 97 % | HEIGHT: 62 IN | DIASTOLIC BLOOD PRESSURE: 86 MMHG

## 2022-11-01 DIAGNOSIS — G89.29 CHRONIC LOW BACK PAIN WITHOUT SCIATICA, UNSPECIFIED BACK PAIN LATERALITY: ICD-10-CM

## 2022-11-01 DIAGNOSIS — G47.33 OSA (OBSTRUCTIVE SLEEP APNEA): ICD-10-CM

## 2022-11-01 DIAGNOSIS — R53.83 OTHER FATIGUE: Primary | ICD-10-CM

## 2022-11-01 DIAGNOSIS — F41.9 ANXIETY: ICD-10-CM

## 2022-11-01 DIAGNOSIS — Z13.220 SCREENING, LIPID: ICD-10-CM

## 2022-11-01 DIAGNOSIS — M54.50 CHRONIC LOW BACK PAIN WITHOUT SCIATICA, UNSPECIFIED BACK PAIN LATERALITY: ICD-10-CM

## 2022-11-01 PROCEDURE — 99214 OFFICE O/P EST MOD 30 MIN: CPT | Performed by: FAMILY MEDICINE

## 2022-11-01 PROCEDURE — 36415 COLL VENOUS BLD VENIPUNCTURE: CPT | Performed by: FAMILY MEDICINE

## 2022-11-01 RX ORDER — PAROXETINE HYDROCHLORIDE 20 MG/1
20 TABLET, FILM COATED ORAL EVERY MORNING
Qty: 90 TABLET | Refills: 3 | Status: SHIPPED | OUTPATIENT
Start: 2022-11-01

## 2022-11-01 ASSESSMENT — ENCOUNTER SYMPTOMS
BACK PAIN: 1
SHORTNESS OF BREATH: 0

## 2022-11-01 ASSESSMENT — PATIENT HEALTH QUESTIONNAIRE - PHQ9
SUM OF ALL RESPONSES TO PHQ QUESTIONS 1-9: 13
4. FEELING TIRED OR HAVING LITTLE ENERGY: 3
SUM OF ALL RESPONSES TO PHQ QUESTIONS 1-9: 13
5. POOR APPETITE OR OVEREATING: 0
SUM OF ALL RESPONSES TO PHQ QUESTIONS 1-9: 13
1. LITTLE INTEREST OR PLEASURE IN DOING THINGS: 2
2. FEELING DOWN, DEPRESSED OR HOPELESS: 1
9. THOUGHTS THAT YOU WOULD BE BETTER OFF DEAD, OR OF HURTING YOURSELF: 0
6. FEELING BAD ABOUT YOURSELF - OR THAT YOU ARE A FAILURE OR HAVE LET YOURSELF OR YOUR FAMILY DOWN: 2
3. TROUBLE FALLING OR STAYING ASLEEP: 2
SUM OF ALL RESPONSES TO PHQ9 QUESTIONS 1 & 2: 3
SUM OF ALL RESPONSES TO PHQ QUESTIONS 1-9: 13
7. TROUBLE CONCENTRATING ON THINGS, SUCH AS READING THE NEWSPAPER OR WATCHING TELEVISION: 2
10. IF YOU CHECKED OFF ANY PROBLEMS, HOW DIFFICULT HAVE THESE PROBLEMS MADE IT FOR YOU TO DO YOUR WORK, TAKE CARE OF THINGS AT HOME, OR GET ALONG WITH OTHER PEOPLE: 1
8. MOVING OR SPEAKING SO SLOWLY THAT OTHER PEOPLE COULD HAVE NOTICED. OR THE OPPOSITE, BEING SO FIGETY OR RESTLESS THAT YOU HAVE BEEN MOVING AROUND A LOT MORE THAN USUAL: 1

## 2022-11-01 NOTE — PROGRESS NOTES
Chief Complaint   Patient presents with    Medication Refill       HPI:  Angela Gee is a 65 y.o. (: 1960) here today   for medication refill. HPI  Takes paxil every 2 days or so. Some issues w/ agitation and not feeling well if too long b/t doses. Does a good job controlling mood, anxiety o/w. No sig SE noted. Had gained some weight in the past.  Has been on keto over past 6 mo. Seems to help pain overall. Ongoing issues w/ back. Fatigue. Some days worse than others. Occas abn color of stool. More fatigued at those times. Eating salads, veggies, meats. Had tried several meds in the past.  Paxil was the only med that helped at the time. Does have issues w/ concentration and motivation. Pain in back aggravates other issues as well. No sig snoring or apnea w/ weight loss. Still fatigue. Last sleep study approx 6 yrs ago. Has had  bipap and cpap in the past.  Nothing currently. Had some issues falling asleep. Has started melatonin. Helping fall asleep. Patient's medications, allergies, past medical, surgical, social and family histories were reviewed and updated as appropriate. ROS:  Review of Systems   Constitutional:  Positive for fatigue. Respiratory:  Negative for shortness of breath. Musculoskeletal:  Positive for back pain. Psychiatric/Behavioral:  Positive for decreased concentration and sleep disturbance.           A1c (no units)   Date Value   2011 5.8     Microscopic Examination (no units)   Date Value   2020 YES     LDL Calculated (mg/dL)   Date Value   2016 161 (H)       Past Medical History:   Diagnosis Date    Anxiety     Esophageal reflux     Fibromyalgia     GERD (gastroesophageal reflux disease)     Hydronephrosis 2020    Unspecified sleep apnea     cpap       Family History   Problem Relation Age of Onset    Arthritis Mother     Asthma Mother     High Blood Pressure Mother     Heart Disease Sister     Heart Disease Father Cancer Sister         breast       Social History     Socioeconomic History    Marital status:      Spouse name: Not on file    Number of children: Not on file    Years of education: Not on file    Highest education level: Not on file   Occupational History    Not on file   Tobacco Use    Smoking status: Never    Smokeless tobacco: Never   Substance and Sexual Activity    Alcohol use: No    Drug use: No    Sexual activity: Yes   Other Topics Concern    Not on file   Social History Narrative    ** Merged History Encounter **          Social Determinants of Health     Financial Resource Strain: Not on file   Food Insecurity: Not on file   Transportation Needs: Not on file   Physical Activity: Not on file   Stress: Not on file   Social Connections: Not on file   Intimate Partner Violence: Not on file   Housing Stability: Not on file       Prior to Visit Medications    Medication Sig Taking? Authorizing Provider   PARoxetine (PAXIL) 20 MG tablet Take 1 tablet by mouth every morning Yes Giovanni Baumgarten, MD   vitamin B-12 (CYANOCOBALAMIN) 1000 MCG tablet Take 1,000 mcg by mouth daily Yes Historical Provider, MD       Allergies   Allergen Reactions    Morphine      Nausea      Morphine     Penicillins      Nausea      Penicillins     Prochlorperazine      dystonic    Prochlorperazine Edisylate        OBJECTIVE:    /86   Pulse 88   Ht 5' 2\" (1.575 m)   Wt 180 lb 12.8 oz (82 kg)   LMP 06/19/2016   SpO2 97%   BMI 33.07 kg/m²     BP Readings from Last 2 Encounters:   11/01/22 134/86   07/23/20 115/70       Wt Readings from Last 3 Encounters:   11/01/22 180 lb 12.8 oz (82 kg)   07/22/20 195 lb (88.5 kg)   07/14/20 195 lb (88.5 kg)       Physical Exam  Constitutional:       Appearance: She is well-developed. HENT:      Head: Normocephalic and atraumatic.       Right Ear: Hearing normal.      Left Ear: Hearing normal.   Eyes:      Conjunctiva/sclera: Conjunctivae normal.   Neck:      Trachea: No tracheal deviation. Cardiovascular:      Rate and Rhythm: Normal rate and regular rhythm. Pulmonary:      Effort: Pulmonary effort is normal.      Breath sounds: Normal breath sounds. Abdominal:      Palpations: Abdomen is soft. Tenderness: There is no abdominal tenderness. Skin:     General: Skin is warm and dry. Neurological:      Mental Status: She is alert and oriented to person, place, and time. Psychiatric:         Mood and Affect: Mood normal.         Behavior: Behavior normal.   Mild tenderness to low back with palpation      ASSESSMENT/PLAN:    1. Other fatigue  Ongoing issues with fatigue. She feels some of this may be related to her back pain and the feeling that she needs to sit around due to the discomfort. Check labs as below. An adequate treatment for her mood and anxiety issues could also be contributing. If labs normal, could consider adjustments to medications as documented below. Known history of obstructive sleep apnea, but no symptoms noted since weight loss. She is not using CPAP or BiPAP currently. Could consider repeat sleep study. Overall, her symptom does not seem to be related to excessive daytime sleepiness  - Comprehensive Metabolic Panel  - TSH  - CBC with Auto Differential  - Vitamin B12  - T4, Free    2. Anxiety  Continue Paxil. Patient has been tolerating well. She had been on multiple medicines in the past and Paxil was the only medication that worked. She has not been on much other SSRIs previously. We discussed taking the Paxil little bit more frequently. She has been having issues with energy level, sleep pattern, concentration, and motivation. Could consider adding Wellbutrin depending on the lab results and how she does with taking Paxil more consistently  - PARoxetine (PAXIL) 20 MG tablet; Take 1 tablet by mouth every morning  Dispense: 90 tablet; Refill: 3    3. YRN (obstructive sleep apnea)  History of obstructive sleep apnea.   No issues since weight loss.  No excessive daytime sleepiness. If ongoing issues of significant fatigue, could consider reevaluation    4. Screening, lipid  Check labs today  - Lipid Panel    5. Chronic low back pain without sciatica, unspecified back pain laterality  Some spurring noted on prior CT. May need more dedicated back imaging in the future. Given exercises at home. Call if fails to improve. Could also consider formal physical therapy            This document was prepared by a combination of typing and transcription through a voice recognition software.

## 2022-11-02 LAB
A/G RATIO: 1.6 (ref 1.1–2.2)
ALBUMIN SERPL-MCNC: 4.6 G/DL (ref 3.4–5)
ALP BLD-CCNC: 87 U/L (ref 40–129)
ALT SERPL-CCNC: 18 U/L (ref 10–40)
ANION GAP SERPL CALCULATED.3IONS-SCNC: 15 MMOL/L (ref 3–16)
AST SERPL-CCNC: 18 U/L (ref 15–37)
BASOPHILS ABSOLUTE: 0.1 K/UL (ref 0–0.2)
BASOPHILS RELATIVE PERCENT: 1.2 %
BILIRUB SERPL-MCNC: 0.6 MG/DL (ref 0–1)
BUN BLDV-MCNC: 14 MG/DL (ref 7–20)
CALCIUM SERPL-MCNC: 9.5 MG/DL (ref 8.3–10.6)
CHLORIDE BLD-SCNC: 104 MMOL/L (ref 99–110)
CHOLESTEROL, TOTAL: 236 MG/DL (ref 0–199)
CO2: 25 MMOL/L (ref 21–32)
CREAT SERPL-MCNC: 0.5 MG/DL (ref 0.6–1.2)
EOSINOPHILS ABSOLUTE: 0.1 K/UL (ref 0–0.6)
EOSINOPHILS RELATIVE PERCENT: 2.1 %
GFR SERPL CREATININE-BSD FRML MDRD: >60 ML/MIN/{1.73_M2}
GLUCOSE BLD-MCNC: 106 MG/DL (ref 70–99)
HCT VFR BLD CALC: 41.6 % (ref 36–48)
HDLC SERPL-MCNC: 61 MG/DL (ref 40–60)
HEMOGLOBIN: 14.3 G/DL (ref 12–16)
LDL CHOLESTEROL CALCULATED: 159 MG/DL
LYMPHOCYTES ABSOLUTE: 1.4 K/UL (ref 1–5.1)
LYMPHOCYTES RELATIVE PERCENT: 24.2 %
MCH RBC QN AUTO: 30.2 PG (ref 26–34)
MCHC RBC AUTO-ENTMCNC: 34.3 G/DL (ref 31–36)
MCV RBC AUTO: 88.1 FL (ref 80–100)
MONOCYTES ABSOLUTE: 0.4 K/UL (ref 0–1.3)
MONOCYTES RELATIVE PERCENT: 7.9 %
NEUTROPHILS ABSOLUTE: 3.6 K/UL (ref 1.7–7.7)
NEUTROPHILS RELATIVE PERCENT: 64.6 %
PDW BLD-RTO: 14.8 % (ref 12.4–15.4)
PLATELET # BLD: 229 K/UL (ref 135–450)
PMV BLD AUTO: 9.2 FL (ref 5–10.5)
POTASSIUM SERPL-SCNC: 4.1 MMOL/L (ref 3.5–5.1)
RBC # BLD: 4.73 M/UL (ref 4–5.2)
SODIUM BLD-SCNC: 144 MMOL/L (ref 136–145)
T4 FREE: 1.1 NG/DL (ref 0.9–1.8)
TOTAL PROTEIN: 7.4 G/DL (ref 6.4–8.2)
TRIGL SERPL-MCNC: 82 MG/DL (ref 0–150)
TSH SERPL DL<=0.05 MIU/L-ACNC: 2.11 UIU/ML (ref 0.27–4.2)
VITAMIN B-12: 1257 PG/ML (ref 211–911)
VLDLC SERPL CALC-MCNC: 16 MG/DL
WBC # BLD: 5.6 K/UL (ref 4–11)

## 2022-11-25 ENCOUNTER — COMMUNITY OUTREACH (OUTPATIENT)
Dept: FAMILY MEDICINE CLINIC | Age: 62
End: 2022-11-25

## 2022-11-25 NOTE — PROGRESS NOTES
Patient's HM shows they are overdue for Mammogram, Colorectal Screening and Cervical Cancer Screening. [facility] and  files searched. No results to attach to order nor HM updated.

## 2023-04-25 ENCOUNTER — OFFICE VISIT (OUTPATIENT)
Dept: FAMILY MEDICINE CLINIC | Age: 63
End: 2023-04-25
Payer: COMMERCIAL

## 2023-04-25 VITALS
HEART RATE: 88 BPM | OXYGEN SATURATION: 98 % | SYSTOLIC BLOOD PRESSURE: 110 MMHG | BODY MASS INDEX: 34.93 KG/M2 | WEIGHT: 191 LBS | DIASTOLIC BLOOD PRESSURE: 72 MMHG

## 2023-04-25 DIAGNOSIS — R35.0 FREQUENCY OF URINATION: ICD-10-CM

## 2023-04-25 DIAGNOSIS — R31.9 URINARY TRACT INFECTION WITH HEMATURIA, SITE UNSPECIFIED: Primary | ICD-10-CM

## 2023-04-25 DIAGNOSIS — N39.0 URINARY TRACT INFECTION WITH HEMATURIA, SITE UNSPECIFIED: Primary | ICD-10-CM

## 2023-04-25 LAB
BILIRUBIN, POC: NEGATIVE
BLOOD URINE, POC: NORMAL
CLARITY, POC: CLEAR
COLOR, POC: YELLOW
GLUCOSE URINE, POC: NEGATIVE
KETONES, POC: NEGATIVE
LEUKOCYTE EST, POC: NORMAL
NITRITE, POC: NEGATIVE
PH, POC: 7
PROTEIN, POC: NORMAL
SPECIFIC GRAVITY, POC: 1.02
UROBILINOGEN, POC: 1

## 2023-04-25 PROCEDURE — 99213 OFFICE O/P EST LOW 20 MIN: CPT

## 2023-04-25 PROCEDURE — 81002 URINALYSIS NONAUTO W/O SCOPE: CPT

## 2023-04-25 RX ORDER — CIPROFLOXACIN 250 MG/1
250 TABLET, FILM COATED ORAL 2 TIMES DAILY
Qty: 14 TABLET | Refills: 0 | Status: SHIPPED | OUTPATIENT
Start: 2023-04-25 | End: 2023-05-02

## 2023-04-25 SDOH — ECONOMIC STABILITY: FOOD INSECURITY: WITHIN THE PAST 12 MONTHS, YOU WORRIED THAT YOUR FOOD WOULD RUN OUT BEFORE YOU GOT MONEY TO BUY MORE.: PATIENT DECLINED

## 2023-04-25 SDOH — ECONOMIC STABILITY: TRANSPORTATION INSECURITY
IN THE PAST 12 MONTHS, HAS LACK OF TRANSPORTATION KEPT YOU FROM MEETINGS, WORK, OR FROM GETTING THINGS NEEDED FOR DAILY LIVING?: NO

## 2023-04-25 SDOH — ECONOMIC STABILITY: INCOME INSECURITY: HOW HARD IS IT FOR YOU TO PAY FOR THE VERY BASICS LIKE FOOD, HOUSING, MEDICAL CARE, AND HEATING?: SOMEWHAT HARD

## 2023-04-25 SDOH — ECONOMIC STABILITY: HOUSING INSECURITY
IN THE LAST 12 MONTHS, WAS THERE A TIME WHEN YOU DID NOT HAVE A STEADY PLACE TO SLEEP OR SLEPT IN A SHELTER (INCLUDING NOW)?: NO

## 2023-04-25 SDOH — ECONOMIC STABILITY: FOOD INSECURITY: WITHIN THE PAST 12 MONTHS, THE FOOD YOU BOUGHT JUST DIDN'T LAST AND YOU DIDN'T HAVE MONEY TO GET MORE.: PATIENT DECLINED

## 2023-04-25 ASSESSMENT — PATIENT HEALTH QUESTIONNAIRE - PHQ9
6. FEELING BAD ABOUT YOURSELF - OR THAT YOU ARE A FAILURE OR HAVE LET YOURSELF OR YOUR FAMILY DOWN: 1
SUM OF ALL RESPONSES TO PHQ QUESTIONS 1-9: 4
SUM OF ALL RESPONSES TO PHQ QUESTIONS 1-9: 4
9. THOUGHTS THAT YOU WOULD BE BETTER OFF DEAD, OR OF HURTING YOURSELF: 0
1. LITTLE INTEREST OR PLEASURE IN DOING THINGS: 0
8. MOVING OR SPEAKING SO SLOWLY THAT OTHER PEOPLE COULD HAVE NOTICED. OR THE OPPOSITE, BEING SO FIGETY OR RESTLESS THAT YOU HAVE BEEN MOVING AROUND A LOT MORE THAN USUAL: 0
SUM OF ALL RESPONSES TO PHQ QUESTIONS 1-9: 4
SUM OF ALL RESPONSES TO PHQ9 QUESTIONS 1 & 2: 0
10. IF YOU CHECKED OFF ANY PROBLEMS, HOW DIFFICULT HAVE THESE PROBLEMS MADE IT FOR YOU TO DO YOUR WORK, TAKE CARE OF THINGS AT HOME, OR GET ALONG WITH OTHER PEOPLE: 1
3. TROUBLE FALLING OR STAYING ASLEEP: 1
4. FEELING TIRED OR HAVING LITTLE ENERGY: 1
5. POOR APPETITE OR OVEREATING: 0
7. TROUBLE CONCENTRATING ON THINGS, SUCH AS READING THE NEWSPAPER OR WATCHING TELEVISION: 1
SUM OF ALL RESPONSES TO PHQ QUESTIONS 1-9: 4
2. FEELING DOWN, DEPRESSED OR HOPELESS: 0

## 2023-04-25 ASSESSMENT — ENCOUNTER SYMPTOMS: RESPIRATORY NEGATIVE: 1

## 2023-04-25 NOTE — PROGRESS NOTES
Chief Complaint   Patient presents with    Urinary Tract Infection     Seen at ER 2 weeks ago, put on bactrim, feels no better        HPI:  Gonzalez Cantu is a 65 y.o. (: 1960) here today   for evaluation of UTI symptoms. Was seen at [facility] ER on 2023 and placed on Bactrim x7 days for UTI. Urine culture does not appear to ever been performed. Patient states she feels no better. Urinalysis in office today positive for moderate leukocytes and trace blood. Urinalysis on 2023 during ED visit showed trace blood, large leukocytes, moderate bacteria. Negative for yeast in the urine. Pt also did home UTI test lastnight and states pos for leukocytes and neg for nitrites. Is following urology through [facility].     Patient's medications, allergies, past medical, surgical, social and family histories were reviewed and updated asappropriate. ROS:  Review of Systems   Constitutional: Negative. HENT: Negative. Respiratory: Negative. Cardiovascular: Negative. Gastrointestinal:         Low abd cramping     Genitourinary:  Positive for dysuria, flank pain, frequency and urgency. Neurological: Negative. Psychiatric/Behavioral: Negative. Prior to Visit Medications    Medication Sig Taking?  Authorizing Provider   ciprofloxacin (CIPRO) 250 MG tablet Take 1 tablet by mouth 2 times daily for 7 days Yes JOHNNY Person - CNP   PARoxetine (PAXIL) 20 MG tablet Take 1 tablet by mouth every morning Yes Tigist Romero MD   vitamin B-12 (CYANOCOBALAMIN) 1000 MCG tablet Take 1 tablet by mouth daily Yes Historical Provider, MD       Allergies   Allergen Reactions    Morphine      Nausea      Morphine     Penicillins      Nausea      Penicillins     Prochlorperazine      dystonic    Prochlorperazine Edisylate        OBJECTIVE:    /72   Pulse 88   Wt 191 lb (86.6 kg)   LMP 2016   SpO2 98%   BMI 34.93 kg/m²     BP Readings from Last 2 Encounters:   23

## 2023-04-26 LAB — BACTERIA UR CULT: NORMAL

## 2023-06-01 ENCOUNTER — OFFICE VISIT (OUTPATIENT)
Dept: FAMILY MEDICINE CLINIC | Age: 63
End: 2023-06-01
Payer: COMMERCIAL

## 2023-06-01 VITALS
DIASTOLIC BLOOD PRESSURE: 86 MMHG | SYSTOLIC BLOOD PRESSURE: 134 MMHG | WEIGHT: 198.4 LBS | HEART RATE: 84 BPM | HEIGHT: 62 IN | OXYGEN SATURATION: 98 % | BODY MASS INDEX: 36.51 KG/M2

## 2023-06-01 DIAGNOSIS — S81.801A WOUND OF RIGHT LOWER EXTREMITY, INITIAL ENCOUNTER: Primary | ICD-10-CM

## 2023-06-01 DIAGNOSIS — R63.5 WEIGHT GAIN: ICD-10-CM

## 2023-06-01 DIAGNOSIS — Z23 NEED FOR TDAP VACCINATION: ICD-10-CM

## 2023-06-01 PROCEDURE — 99213 OFFICE O/P EST LOW 20 MIN: CPT | Performed by: FAMILY MEDICINE

## 2023-06-01 PROCEDURE — 90715 TDAP VACCINE 7 YRS/> IM: CPT | Performed by: FAMILY MEDICINE

## 2023-06-01 PROCEDURE — 90471 IMMUNIZATION ADMIN: CPT | Performed by: FAMILY MEDICINE

## 2023-06-01 RX ORDER — CEPHALEXIN 500 MG/1
500 CAPSULE ORAL 3 TIMES DAILY
Qty: 30 CAPSULE | Refills: 0 | Status: SHIPPED | OUTPATIENT
Start: 2023-06-01 | End: 2023-06-11

## 2023-06-01 ASSESSMENT — ENCOUNTER SYMPTOMS: SHORTNESS OF BREATH: 0

## 2023-06-01 NOTE — PROGRESS NOTES
Chief Complaint   Patient presents with    Wound Check     Wound to right lower leg       HPI:  Sophia Garcia is a 65 y.o. (: 1960) here today   for wound to right lower leg. HPI  Was at [facility] in [facility]. Piece of metal on fridge there was popped out. Puncture wound. Redness. Injury was . Was bandaged there. No abx. Only local wound care. Occas discomfort. No fevers noted. Not utd on tetanus. Patient had been doing more of a keto diet. She discontinued that due to constipation. Overall her diet has been worse. She is gaining weight. Pain, with the question of possible fibromyalgia, has gotten worse since she has gone off of the low carbohydrate diet and gained weight. Patient's medications, allergies, past medical, surgical, social and family histories were reviewed and updated as appropriate. ROS:  Review of Systems   Constitutional:  Negative for fever. Respiratory:  Negative for shortness of breath. Cardiovascular:  Negative for chest pain. Skin:  Positive for wound.          A1c (no units)   Date Value   2011 5.8     Microscopic Examination (no units)   Date Value   2020 YES     LDL Calculated (mg/dL)   Date Value   2022 159 (H)       Past Medical History:   Diagnosis Date    Anxiety     Esophageal reflux     Fibromyalgia     GERD (gastroesophageal reflux disease)     Hydronephrosis 2020    Unspecified sleep apnea     cpap       Family History   Problem Relation Age of Onset    Arthritis Mother     Asthma Mother     High Blood Pressure Mother     Heart Disease Sister     Heart Disease Father     Cancer Sister         breast       Social History     Socioeconomic History    Marital status:      Spouse name: Not on file    Number of children: Not on file    Years of education: Not on file    Highest education level: Not on file   Occupational History    Not on file   Tobacco Use    Smoking status: Never    Smokeless tobacco:

## 2023-09-13 ENCOUNTER — OFFICE VISIT (OUTPATIENT)
Dept: FAMILY MEDICINE CLINIC | Age: 63
End: 2023-09-13
Payer: COMMERCIAL

## 2023-09-13 VITALS
BODY MASS INDEX: 38.41 KG/M2 | DIASTOLIC BLOOD PRESSURE: 80 MMHG | WEIGHT: 210 LBS | OXYGEN SATURATION: 97 % | HEART RATE: 81 BPM | SYSTOLIC BLOOD PRESSURE: 136 MMHG

## 2023-09-13 DIAGNOSIS — R31.9 HEMATURIA, UNSPECIFIED TYPE: ICD-10-CM

## 2023-09-13 DIAGNOSIS — R10.9 RIGHT FLANK PAIN: ICD-10-CM

## 2023-09-13 DIAGNOSIS — R39.9 UTI SYMPTOMS: Primary | ICD-10-CM

## 2023-09-13 DIAGNOSIS — R35.0 FREQUENCY OF URINATION: ICD-10-CM

## 2023-09-13 DIAGNOSIS — R10.11 RIGHT UPPER QUADRANT ABDOMINAL PAIN: ICD-10-CM

## 2023-09-13 LAB
BILIRUBIN, POC: NORMAL
BLOOD URINE, POC: NORMAL
CLARITY, POC: NORMAL
COLOR, POC: YELLOW
GLUCOSE URINE, POC: NORMAL
KETONES, POC: NORMAL
LEUKOCYTE EST, POC: NORMAL
NITRITE, POC: NORMAL
PH, POC: 5.5
PROTEIN, POC: 30
SPECIFIC GRAVITY, POC: 1.03
UROBILINOGEN, POC: 0.2

## 2023-09-13 PROCEDURE — 81002 URINALYSIS NONAUTO W/O SCOPE: CPT

## 2023-09-13 PROCEDURE — 99214 OFFICE O/P EST MOD 30 MIN: CPT

## 2023-09-13 RX ORDER — NITROFURANTOIN 25; 75 MG/1; MG/1
100 CAPSULE ORAL 2 TIMES DAILY
Qty: 20 CAPSULE | Refills: 0 | Status: SHIPPED | OUTPATIENT
Start: 2023-09-13 | End: 2023-09-23

## 2023-09-13 ASSESSMENT — ENCOUNTER SYMPTOMS
DIARRHEA: 0
VOMITING: 0
NAUSEA: 0
RESPIRATORY NEGATIVE: 1
ABDOMINAL DISTENTION: 0
CONSTIPATION: 0
ABDOMINAL PAIN: 1

## 2023-09-15 LAB — BACTERIA UR CULT: NORMAL

## 2023-09-28 ENCOUNTER — OFFICE VISIT (OUTPATIENT)
Dept: FAMILY MEDICINE CLINIC | Age: 63
End: 2023-09-28
Payer: COMMERCIAL

## 2023-09-28 VITALS
SYSTOLIC BLOOD PRESSURE: 122 MMHG | HEIGHT: 62 IN | BODY MASS INDEX: 39.27 KG/M2 | WEIGHT: 213.4 LBS | OXYGEN SATURATION: 98 % | DIASTOLIC BLOOD PRESSURE: 88 MMHG | HEART RATE: 86 BPM

## 2023-09-28 DIAGNOSIS — N20.0 KIDNEY STONE: ICD-10-CM

## 2023-09-28 DIAGNOSIS — Z01.818 PREOP EXAMINATION: Primary | ICD-10-CM

## 2023-09-28 DIAGNOSIS — G47.33 OSA (OBSTRUCTIVE SLEEP APNEA): ICD-10-CM

## 2023-09-28 DIAGNOSIS — R10.9 RIGHT FLANK PAIN: ICD-10-CM

## 2023-09-28 PROCEDURE — 99213 OFFICE O/P EST LOW 20 MIN: CPT | Performed by: FAMILY MEDICINE

## 2023-09-28 PROCEDURE — 36415 COLL VENOUS BLD VENIPUNCTURE: CPT | Performed by: FAMILY MEDICINE

## 2023-09-28 ASSESSMENT — ENCOUNTER SYMPTOMS: SHORTNESS OF BREATH: 0

## 2023-09-28 NOTE — PROGRESS NOTES
Eun Gramajo  YOB: 1960    Date of Service:  9/28/2023      Wt Readings from Last 2 Encounters:   09/28/23 213 lb 6.4 oz (96.8 kg)   09/13/23 210 lb (95.3 kg)       BP Readings from Last 3 Encounters:   09/28/23 (!) 132/96   09/13/23 136/80   06/01/23 134/86        Chief Complaint   Patient presents with    Pre-op Exam     Patient is having lithotripsy on 10/05/23 by Jt Johnson NP at [facility].       Allergies   Allergen Reactions    Morphine      Nausea      Morphine     Penicillins      Nausea      Penicillins     Prochlorperazine      dystonic    Prochlorperazine Edisylate        Outpatient Medications Marked as Taking for the 9/28/23 encounter (Office Visit) with Liv Castelan MD   Medication Sig Dispense Refill    PARoxetine (PAXIL) 20 MG tablet Take 1 tablet by mouth every morning 90 tablet 3       This patient presents to the office today for a preoperative consultation for preop at the request of surgeon, Dr Amelia Johnson, who plans on performing Lithotripsy on October 5 at [facility]. The current problem began several  months ago, and symptoms have been worsening with time. Conservative therapy: N/A. Sig right flank pain. Some pain on left as well. Sxs worsening w/ time. Hydronephrosis noted on prior imaging on CT.       Planned anesthesia: General   Known anesthesia problems: None   Bleeding risk: No recent or remote history of abnormal bleeding  Personal or FH of DVT/PE: No    Patient objection to receiving blood products: No    Past Medical History:   Diagnosis Date    Anxiety     Esophageal reflux     Fibromyalgia     GERD (gastroesophageal reflux disease)     Hydronephrosis 07/04/2020    Unspecified sleep apnea     cpap       Past Surgical History:   Procedure Laterality Date    CHOLECYSTECTOMY      CORONARY ARTERY BYPASS GRAFT      CYSTOSCOPY  01/18/2013    CYSTOSCOPY INSERTION / REMOVAL STENT / STONE Right 07/06/2020    CYSTOSCOPY, RIGHT URETEROSCOPY, RIGHT RETROGRADE PYELOGRAM,

## 2023-09-29 LAB
ANION GAP SERPL CALCULATED.3IONS-SCNC: 9 MMOL/L (ref 3–16)
BASOPHILS # BLD: 0.1 K/UL (ref 0–0.2)
BASOPHILS NFR BLD: 1 %
BUN SERPL-MCNC: 17 MG/DL (ref 7–20)
CALCIUM SERPL-MCNC: 8.7 MG/DL (ref 8.3–10.6)
CHLORIDE SERPL-SCNC: 105 MMOL/L (ref 99–110)
CO2 SERPL-SCNC: 30 MMOL/L (ref 21–32)
CREAT SERPL-MCNC: 0.6 MG/DL (ref 0.6–1.2)
DEPRECATED RDW RBC AUTO: 15.3 % (ref 12.4–15.4)
EOSINOPHIL # BLD: 0.2 K/UL (ref 0–0.6)
EOSINOPHIL NFR BLD: 3.8 %
GFR SERPLBLD CREATININE-BSD FMLA CKD-EPI: >60 ML/MIN/{1.73_M2}
GLUCOSE SERPL-MCNC: 119 MG/DL (ref 70–99)
HCT VFR BLD AUTO: 42 % (ref 36–48)
HGB BLD-MCNC: 13.8 G/DL (ref 12–16)
LYMPHOCYTES # BLD: 1.3 K/UL (ref 1–5.1)
LYMPHOCYTES NFR BLD: 22.4 %
MCH RBC QN AUTO: 29.3 PG (ref 26–34)
MCHC RBC AUTO-ENTMCNC: 32.9 G/DL (ref 31–36)
MCV RBC AUTO: 89.2 FL (ref 80–100)
MONOCYTES # BLD: 0.4 K/UL (ref 0–1.3)
MONOCYTES NFR BLD: 7 %
NEUTROPHILS # BLD: 3.8 K/UL (ref 1.7–7.7)
NEUTROPHILS NFR BLD: 65.8 %
PLATELET # BLD AUTO: 252 K/UL (ref 135–450)
PMV BLD AUTO: 8.9 FL (ref 5–10.5)
POTASSIUM SERPL-SCNC: 4.8 MMOL/L (ref 3.5–5.1)
RBC # BLD AUTO: 4.71 M/UL (ref 4–5.2)
SODIUM SERPL-SCNC: 144 MMOL/L (ref 136–145)
WBC # BLD AUTO: 5.7 K/UL (ref 4–11)

## 2023-10-09 ENCOUNTER — COMMUNITY OUTREACH (OUTPATIENT)
Dept: FAMILY MEDICINE CLINIC | Age: 63
End: 2023-10-09

## 2023-12-05 ENCOUNTER — OFFICE VISIT (OUTPATIENT)
Dept: FAMILY MEDICINE CLINIC | Age: 63
End: 2023-12-05
Payer: COMMERCIAL

## 2023-12-05 VITALS
HEART RATE: 68 BPM | BODY MASS INDEX: 39.9 KG/M2 | DIASTOLIC BLOOD PRESSURE: 78 MMHG | SYSTOLIC BLOOD PRESSURE: 128 MMHG | WEIGHT: 216.8 LBS | OXYGEN SATURATION: 96 % | HEIGHT: 62 IN

## 2023-12-05 DIAGNOSIS — F41.9 ANXIETY: ICD-10-CM

## 2023-12-05 DIAGNOSIS — R19.7 DIARRHEA, UNSPECIFIED TYPE: Primary | ICD-10-CM

## 2023-12-05 LAB
ALBUMIN SERPL-MCNC: 4.4 G/DL (ref 3.4–5)
ALBUMIN/GLOB SERPL: 1.6 {RATIO} (ref 1.1–2.2)
ALP SERPL-CCNC: 130 U/L (ref 40–129)
ALT SERPL-CCNC: 18 U/L (ref 10–40)
ANION GAP SERPL CALCULATED.3IONS-SCNC: 9 MMOL/L (ref 3–16)
AST SERPL-CCNC: 18 U/L (ref 15–37)
BASOPHILS # BLD: 0.1 K/UL (ref 0–0.2)
BASOPHILS NFR BLD: 1.5 %
BILIRUB SERPL-MCNC: 0.4 MG/DL (ref 0–1)
BUN SERPL-MCNC: 18 MG/DL (ref 7–20)
CALCIUM SERPL-MCNC: 9.6 MG/DL (ref 8.3–10.6)
CHLORIDE SERPL-SCNC: 104 MMOL/L (ref 99–110)
CO2 SERPL-SCNC: 29 MMOL/L (ref 21–32)
CREAT SERPL-MCNC: 0.6 MG/DL (ref 0.6–1.2)
DEPRECATED RDW RBC AUTO: 14.1 % (ref 12.4–15.4)
EOSINOPHIL # BLD: 0.2 K/UL (ref 0–0.6)
EOSINOPHIL NFR BLD: 3.3 %
GFR SERPLBLD CREATININE-BSD FMLA CKD-EPI: >60 ML/MIN/{1.73_M2}
GLUCOSE SERPL-MCNC: 110 MG/DL (ref 70–99)
HCT VFR BLD AUTO: 42.1 % (ref 36–48)
HGB BLD-MCNC: 13.8 G/DL (ref 12–16)
LYMPHOCYTES # BLD: 2 K/UL (ref 1–5.1)
LYMPHOCYTES NFR BLD: 35.4 %
MAGNESIUM SERPL-MCNC: 2.2 MG/DL (ref 1.8–2.4)
MCH RBC QN AUTO: 29.5 PG (ref 26–34)
MCHC RBC AUTO-ENTMCNC: 32.9 G/DL (ref 31–36)
MCV RBC AUTO: 89.9 FL (ref 80–100)
MONOCYTES # BLD: 0.4 K/UL (ref 0–1.3)
MONOCYTES NFR BLD: 7.7 %
NEUTROPHILS # BLD: 3 K/UL (ref 1.7–7.7)
NEUTROPHILS NFR BLD: 52.1 %
PLATELET # BLD AUTO: 273 K/UL (ref 135–450)
PMV BLD AUTO: 9.1 FL (ref 5–10.5)
POTASSIUM SERPL-SCNC: 4.6 MMOL/L (ref 3.5–5.1)
PROT SERPL-MCNC: 7.1 G/DL (ref 6.4–8.2)
RBC # BLD AUTO: 4.68 M/UL (ref 4–5.2)
SODIUM SERPL-SCNC: 142 MMOL/L (ref 136–145)
T4 FREE SERPL-MCNC: 1.1 NG/DL (ref 0.9–1.8)
TSH SERPL DL<=0.005 MIU/L-ACNC: 3.53 UIU/ML (ref 0.27–4.2)
WBC # BLD AUTO: 5.7 K/UL (ref 4–11)

## 2023-12-05 PROCEDURE — 99213 OFFICE O/P EST LOW 20 MIN: CPT | Performed by: FAMILY MEDICINE

## 2023-12-05 PROCEDURE — 36415 COLL VENOUS BLD VENIPUNCTURE: CPT | Performed by: FAMILY MEDICINE

## 2023-12-05 RX ORDER — DICYCLOMINE HYDROCHLORIDE 10 MG/1
10 CAPSULE ORAL
Qty: 45 CAPSULE | Refills: 0 | Status: SHIPPED | OUTPATIENT
Start: 2023-12-05

## 2023-12-05 ASSESSMENT — ENCOUNTER SYMPTOMS
SHORTNESS OF BREATH: 0
ABDOMINAL PAIN: 1
ABDOMINAL DISTENTION: 0
DIARRHEA: 1

## 2023-12-05 NOTE — PROGRESS NOTES
Wallace Brewer (:  1960) is a 59 y.o. female,Established patient, here for evaluation of the following chief complaint(s):  Diarrhea        Subjective   SUBJECTIVE/OBJECTIVE:  Diarrhea   This is a new problem. The current episode started 1 to 4 weeks ago. The problem occurs 2 to 4 times per day. The problem has been unchanged. Associated symptoms include abdominal pain and a fever (1 day. resolved). Exacerbated by: eating. There are no known risk factors. She has tried change of diet, increased fluids and electrolyte solution for the symptoms. The treatment provided mild relief. Review of Systems   Constitutional:  Positive for fever (1 day. resolved). Respiratory:  Negative for shortness of breath. Cardiovascular:  Negative for chest pain. Gastrointestinal:  Positive for abdominal pain and diarrhea. Negative for abdominal distention. Genitourinary: Negative. Psychiatric/Behavioral:  The patient is nervous/anxious. Pt presents w/ diarrhea that has persisted for 10 days. Started watery. Took imodipine, seemed to improve for a couple days. Is having a bowel movement 3-4x/day, soft stool produced. No blood in stool. Initially had generalized abdominal pain, no cramping noted. Sat after dinner w/ had a \"flushing sensations in waves\", which she noted she has felt w/ anxiety in the past.  Drank liquid IV and felt relief, but diarrhea has continued. Has been drinking 50oz crystal lite everyday, says it is the only thing she drinks be cause she is trying to avoid more kidney stones as she had a lithotripsy 10/5. No new foods, travel, exposures. Objective   Physical Exam  Constitutional:       Appearance: Normal appearance. HENT:      Head: Normocephalic and atraumatic. Eyes:      Extraocular Movements: Extraocular movements intact. Pupils: Pupils are equal, round, and reactive to light. Cardiovascular:      Rate and Rhythm: Normal rate and regular rhythm.    Pulmonary:
Appearance: She is well-developed. HENT:      Head: Normocephalic and atraumatic. Right Ear: Hearing normal.      Left Ear: Hearing normal.   Eyes:      Conjunctiva/sclera: Conjunctivae normal.   Neck:      Trachea: No tracheal deviation. Cardiovascular:      Rate and Rhythm: Normal rate and regular rhythm. Pulmonary:      Effort: Pulmonary effort is normal.      Breath sounds: Normal breath sounds. Abdominal:      Palpations: Abdomen is soft. Tenderness: There is no abdominal tenderness. Skin:     General: Skin is warm and dry. Neurological:      Mental Status: She is alert and oriented to person, place, and time. Psychiatric:         Mood and Affect: Mood normal.         Behavior: Behavior normal.           ASSESSMENT/PLAN:    1. Diarrhea, unspecified type  Possible ibs component triggered by recent GI illness. Check labs as below. Prn meds as below. Discussed reduce crystal lite. Add probiotic. Call if fails to improve. - Comprehensive Metabolic Panel  - CBC with Auto Differential  - TSH  - T4, Free  - Magnesium  - dicyclomine (BENTYL) 10 MG capsule; Take 1 capsule by mouth 4 times daily (before meals and nightly)  Dispense: 45 capsule; Refill: 0    2. Anxiety  May be contrib to above.

## 2023-12-06 NOTE — RESULT ENCOUNTER NOTE
Random glucose of 110, otherwise CMP essentially normal.  Slight elevation in alkaline phosphatase, similar to prior. Magnesium is normal.  Thyroid labs normal.  CBC is normal.  If symptoms fail to improve with medication as prescribed yesterday, time, appropriate hydration, consider GI referral for additional evaluation.   May need to consider stool studies if fails to improve

## 2024-02-05 ENCOUNTER — OFFICE VISIT (OUTPATIENT)
Dept: FAMILY MEDICINE CLINIC | Age: 64
End: 2024-02-05
Payer: COMMERCIAL

## 2024-02-05 VITALS
WEIGHT: 213.6 LBS | OXYGEN SATURATION: 96 % | HEART RATE: 73 BPM | BODY MASS INDEX: 39.31 KG/M2 | DIASTOLIC BLOOD PRESSURE: 78 MMHG | SYSTOLIC BLOOD PRESSURE: 122 MMHG | HEIGHT: 62 IN

## 2024-02-05 DIAGNOSIS — R03.0 ELEVATED BLOOD PRESSURE READING: ICD-10-CM

## 2024-02-05 DIAGNOSIS — G44.89 OTHER HEADACHE SYNDROME: ICD-10-CM

## 2024-02-05 DIAGNOSIS — F41.9 ANXIETY: ICD-10-CM

## 2024-02-05 DIAGNOSIS — R55 SYNCOPE, UNSPECIFIED SYNCOPE TYPE: Primary | ICD-10-CM

## 2024-02-05 PROCEDURE — 99214 OFFICE O/P EST MOD 30 MIN: CPT | Performed by: FAMILY MEDICINE

## 2024-02-05 RX ORDER — METOPROLOL SUCCINATE 25 MG/1
25 TABLET, EXTENDED RELEASE ORAL DAILY
COMMUNITY
End: 2024-02-05 | Stop reason: ALTCHOICE

## 2024-02-05 RX ORDER — ALPRAZOLAM 0.25 MG/1
0.25 TABLET ORAL NIGHTLY PRN
Qty: 15 TABLET | Refills: 0 | Status: SHIPPED | OUTPATIENT
Start: 2024-02-05 | End: 2024-03-06

## 2024-02-05 ASSESSMENT — ENCOUNTER SYMPTOMS: SHORTNESS OF BREATH: 0

## 2024-02-05 NOTE — PROGRESS NOTES
Chief Complaint   Patient presents with    Follow-up     AC er / for syncope episode  Had sharp head pains, got clammy, and lost consciousness.       HPI:  Christina Alfaro is a 63 y.o. (: 1960) here today for er follow-up.  HPI  Was doing well in am.  Felt well yesterday am.  Went for a walk.  To grocery store.  [place] fine.  Went for a drive.  Ate 2 hamburger patties from [employer]. Tasted salty.  Began to have sig sharp pain to left side of head.  Shooting, stabbing pain.  Clusters.  Sensation of being flushed.  Took some advil (400mg).  Felt as if could pass out.  Passed out/ slumped forward in the car.  Felt clammy.  Pulse felt ok.  Taken to ER.  Taken to CT of head/ CTA.  Arms and legs felt heavy.  Woke up fairly quickly w/ CT scan.  HR around 100.  Bp kept inc.  Was given IVF, metoprolol in ER.  Labs done as well.  Overall began to feel better in ER.  Headache resolved in the emergency department without other significant intervention.    Patient's medications, allergies, past medical, surgical, social and family histories were reviewed and updated as appropriate.    ROS:  Review of Systems   Constitutional:  Negative for fever.   Respiratory:  Negative for shortness of breath.    Neurological:  Positive for syncope and headaches.           Prior to Visit Medications    Medication Sig Taking? Authorizing Provider   metoprolol tartrate (LOPRESSOR) 25 MG tablet Take 1 tablet by mouth 2 times daily Yes eRese Donaldson MD   ALPRAZolam (XANAX) 0.25 MG tablet Take 1 tablet by mouth nightly as needed for Anxiety for up to 30 days. Max Daily Amount: 0.25 mg Yes Reese Donaldson MD   PARoxetine (PAXIL) 20 MG tablet Take 1 tablet by mouth every morning Yes Reese Donaldson MD       Allergies   Allergen Reactions    Morphine      Nausea      Morphine     Penicillins      Nausea      Penicillins     Prochlorperazine      dystonic    Prochlorperazine Edisylate        OBJECTIVE:    /78   Pulse 73   Ht 1.575 m (5'

## 2024-02-07 LAB — TROPONIN: 0.01

## 2024-02-08 ENCOUNTER — TELEPHONE (OUTPATIENT)
Dept: FAMILY MEDICINE CLINIC | Age: 64
End: 2024-02-08

## 2024-02-12 ENCOUNTER — OFFICE VISIT (OUTPATIENT)
Dept: FAMILY MEDICINE CLINIC | Age: 64
End: 2024-02-12
Payer: COMMERCIAL

## 2024-02-12 VITALS
WEIGHT: 212.8 LBS | OXYGEN SATURATION: 97 % | BODY MASS INDEX: 39.16 KG/M2 | DIASTOLIC BLOOD PRESSURE: 72 MMHG | HEIGHT: 62 IN | SYSTOLIC BLOOD PRESSURE: 112 MMHG | HEART RATE: 68 BPM

## 2024-02-12 DIAGNOSIS — R03.0 ELEVATED BLOOD PRESSURE READING: ICD-10-CM

## 2024-02-12 DIAGNOSIS — F41.9 ANXIETY: Primary | ICD-10-CM

## 2024-02-12 PROCEDURE — 99213 OFFICE O/P EST LOW 20 MIN: CPT | Performed by: FAMILY MEDICINE

## 2024-02-12 RX ORDER — PAROXETINE HYDROCHLORIDE 40 MG/1
40 TABLET, FILM COATED ORAL EVERY MORNING
Qty: 30 TABLET | Refills: 3 | Status: SHIPPED | OUTPATIENT
Start: 2024-02-12

## 2024-02-12 NOTE — PROGRESS NOTES
Chief Complaint   Patient presents with    ED Follow-up     Patient was in [facility] ER on 24 for high blood pressure and anxiety.       HPI:  Christina Alfaro is a 63 y.o. (: 1960) here today   for follow up from ED.  Patient was in [facility] ER on 24 for high blood pressure and anxiety.  HPI  Had recurrent episode last week.  Was tearful, sig anxiety.  Diff describing severity of anxiety.  Bp was 195/109 at home.  Was tearful and \"not acting right\" at home.  No syncope noted.  Was given ativan x 1 in ER.  Rx for hydroxizine.  Took one over the weekend.  Did not seem to do anything.  Some relief w/ xanax over the weekend.  Tried some deep breathing exercises prior.  Has been on current dose of paxil for some time.  For a period of time, had been taking every other day paxil.  Has been on daily for some time.  Recently inc to 30mg less than a week ago.      Also had episode approx 6-8 weeks ago after being out to dinner w/ friends.  Will get flushing sensation periodically.    Patient's medications, allergies, past medical, surgical, social and family histories were reviewed and updated as appropriate.    ROS:  Review of Systems   Constitutional:  Negative for fever.   Psychiatric/Behavioral:  Positive for dysphoric mood. The patient is nervous/anxious.            A1c (no units)   Date Value   2011 5.8     LDL Calculated (mg/dL)   Date Value   2022 159 (H)       Past Medical History:   Diagnosis Date    Anxiety     Esophageal reflux     Fibromyalgia     GERD (gastroesophageal reflux disease)     Hydronephrosis 2020    Unspecified sleep apnea     cpap       Family History   Problem Relation Age of Onset    Arthritis Mother     Asthma Mother     High Blood Pressure Mother     Heart Disease Sister     Heart Disease Father     Cancer Sister         breast       Social History     Socioeconomic History    Marital status:      Spouse name: Not on file    Number of children: Not on file    Years

## 2024-03-05 ENCOUNTER — OFFICE VISIT (OUTPATIENT)
Dept: FAMILY MEDICINE CLINIC | Age: 64
End: 2024-03-05
Payer: COMMERCIAL

## 2024-03-05 ENCOUNTER — TELEMEDICINE (OUTPATIENT)
Dept: PSYCHOLOGY | Age: 64
End: 2024-03-05
Payer: COMMERCIAL

## 2024-03-05 VITALS
HEART RATE: 66 BPM | WEIGHT: 215 LBS | OXYGEN SATURATION: 96 % | DIASTOLIC BLOOD PRESSURE: 68 MMHG | HEIGHT: 62 IN | SYSTOLIC BLOOD PRESSURE: 136 MMHG | BODY MASS INDEX: 39.56 KG/M2

## 2024-03-05 DIAGNOSIS — F43.10 COMPLEX POSTTRAUMATIC STRESS DISORDER: Primary | ICD-10-CM

## 2024-03-05 DIAGNOSIS — R55 SYNCOPE, UNSPECIFIED SYNCOPE TYPE: ICD-10-CM

## 2024-03-05 DIAGNOSIS — F41.9 ANXIETY: Primary | ICD-10-CM

## 2024-03-05 DIAGNOSIS — R03.0 ELEVATED BLOOD PRESSURE READING: ICD-10-CM

## 2024-03-05 DIAGNOSIS — R68.82 DECREASED LIBIDO: ICD-10-CM

## 2024-03-05 PROCEDURE — 99213 OFFICE O/P EST LOW 20 MIN: CPT | Performed by: FAMILY MEDICINE

## 2024-03-05 PROCEDURE — 90791 PSYCH DIAGNOSTIC EVALUATION: CPT | Performed by: SOCIAL WORKER

## 2024-03-05 RX ORDER — PAROXETINE 30 MG/1
30 TABLET, FILM COATED ORAL DAILY
Qty: 30 TABLET | Refills: 3 | Status: SHIPPED | OUTPATIENT
Start: 2024-03-05

## 2024-03-05 ASSESSMENT — ENCOUNTER SYMPTOMS: SHORTNESS OF BREATH: 0

## 2024-03-05 ASSESSMENT — PATIENT HEALTH QUESTIONNAIRE - PHQ9
SUM OF ALL RESPONSES TO PHQ QUESTIONS 1-9: 0
SUM OF ALL RESPONSES TO PHQ9 QUESTIONS 1 & 2: 0
1. LITTLE INTEREST OR PLEASURE IN DOING THINGS: 0
2. FEELING DOWN, DEPRESSED OR HOPELESS: 0
SUM OF ALL RESPONSES TO PHQ QUESTIONS 1-9: 0

## 2024-03-05 NOTE — PATIENT INSTRUCTIONS
counting to four slowly. Feel the air enter your lungs.  Step 2: Hold your breath for 4 seconds. Try to avoid inhaling or exhaling for 4 seconds.  Step 3: Slowly exhale through your nose for 4 seconds.  Step 4: Repeat steps 1 to 3 until you feel re-centered.  Repeat this exercise as many times as you can. Thirty seconds of deep breathing will help you feel more relaxed and in control.           Panic Disorder Handout    What Is a Panic Attack?  Sometimes we experience a sudden and severe onset of symptoms that can be scary. These symptoms can include some or all of the following:     Pounding heart or increased heart rate   Feeling dizzy, unsteady, lightheaded, or faint   Sweating   Nausea   Feelings of unreality or being detached from yourself   Trembling or shaking   Fear of losing control or going crazy   Shortness of breath   Fear of dying   Feeling of choking   Numbness or tingling   Chest pain   Chills or hot flashes    Although we don’t fully understand why some people experience panic attacks and other people don’t, we do know that these symptoms are related to a very normal response called the fight-flight response. This response allows our body to react quickly when we think that something is dangerous, such as being attacked or being cut off when we are driving.    How Panic Attacks Affect Our Lives    Because symptoms of a panic attack occur out of the blue, we can become worried about them, and we may begin to avoid situations that we think will result in these panic symptoms, such as crowded stores, public transportation, or driving. What situations have you avoided because of panic attacks?                                                                                                                                                                                                                              Changing Thinking Patterns    One of the most important changes associated with decreasing

## 2024-03-05 NOTE — PROGRESS NOTES
Chief Complaint   Patient presents with    Follow-up     1 month follow up       HPI:  Christina Alfaro is a 63 y.o. (: 1960) here today   for 1 month follow up.  HPI  Has been taking med for bp.  Metoprolol. Taking bid. Higher dose of paxil.  Was seen by cardio, rec stress test, but denied.  Still trying to eval next steps.  Had cardiac monitor.  No worrisome finding.  Spoke w/ psychology this am.  Concern for cost.  Has only used xanax x 1.      Cardio f/u planned in August.      Over the past month, since increasing her Paxil and adding other medications as listed, she has had some decrease in libido noted.      Patient's medications, allergies, past medical, surgical, social and family histories were reviewed and updated as appropriate.    ROS:  Review of Systems   Constitutional:  Negative for fever.   Respiratory:  Negative for shortness of breath.    Cardiovascular:  Positive for palpitations. Negative for chest pain.   Psychiatric/Behavioral:  Positive for sleep disturbance. The patient is nervous/anxious.            A1c (no units)   Date Value   2011 5.8     LDL Calculated (mg/dL)   Date Value   2022 159 (H)       Past Medical History:   Diagnosis Date    Anxiety     Esophageal reflux     Fibromyalgia     GERD (gastroesophageal reflux disease)     Hydronephrosis 2020    Unspecified sleep apnea     cpap       Family History   Problem Relation Age of Onset    Arthritis Mother     Asthma Mother     High Blood Pressure Mother     Heart Disease Sister     Heart Disease Father     Cancer Sister         breast       Social History     Socioeconomic History    Marital status:      Spouse name: Not on file    Number of children: Not on file    Years of education: Not on file    Highest education level: Not on file   Occupational History    Not on file   Tobacco Use    Smoking status: Never    Smokeless tobacco: Never   Substance and Sexual Activity    Alcohol use: No    Drug use: No

## 2024-03-05 NOTE — PROGRESS NOTES
Behavioral Health Consultation  MACKENZIE Wang  3/5/2024  8:34 AM EST      Time spent with Patient: 30 minutes  This is patient's first [facility] appointment.    Reason for Consult:    Chief Complaint   Patient presents with    Anxiety    Depression     Referring Provider: Reese Donaldson MD  [id number] [address] [address]    Pt provided informed consent for the behavioral health program. Discussed with patient model of service to include the limits of confidentiality (i.e. abuse reporting, suicide intervention, etc.) and short-term intervention focused approach. Pt indicated understanding.  Feedback given to PCP.    TELEHEALTH VISIT -- Audio/Visual   }  Services were provided through a video synchronous discussion virtually to substitute for in-person clinic visit. Pt gave verbal informed consent to participate in telehealth services.     Conducted a risk-benefit analysis and determined that the patient's presenting problems are consistent with the use of telepsychology. Determined that the patient has sufficient knowledge and skills in the use of technology enabling them to adequately benefit from telepsychology. It was determined that this patient was able to be properly treated without an in-person session. Patient verified that they were currently located at the [place] address that was provided during registration.    Verified the following information:  Patient's identification: Yes  Patient location: [address] [place] [address]  Patient's call back number: [phone]   Patient's emergency contact's name and number, as well as permission to contact them if needed: Extended Emergency Contact Information  Primary Emergency Contact: Malcolm Alfaro  Address: [address] [place] [address] [place]  Home Phone: [phone]  Mobile Phone: [phone]  Relation: Spouse     Provider location: [place], [place]     S:  Patient presents with concerns about

## 2024-03-18 DIAGNOSIS — R03.0 ELEVATED BLOOD PRESSURE READING: Primary | ICD-10-CM

## 2024-03-18 RX ORDER — LISINOPRIL 10 MG/1
10 TABLET ORAL DAILY
Qty: 30 TABLET | Refills: 0 | Status: SHIPPED | OUTPATIENT
Start: 2024-03-18

## 2024-03-18 NOTE — TELEPHONE ENCOUNTER
Pt states palpitations are better and is not having issues with them anymore. She would like to taper off the metoprolol, please advise?

## 2024-03-18 NOTE — TELEPHONE ENCOUNTER
The metoprolol was designed to help lower her blood pressure and decrease frequency of palpitations.  How are her palpitations?  From a blood pressure perspective, there are multiple other options that could be explored.  If we were to stop the metoprolol, we would need to taper off of it slowly

## 2024-03-18 NOTE — TELEPHONE ENCOUNTER
Pt is having issues with anxiety getting worse when she takes the metoprolol and is wondering if you can change that medication to something different or if she can stop it?

## 2024-03-18 NOTE — TELEPHONE ENCOUNTER
Add lisinopril 10mg po daily.  Cut metoprolol to 1/2 tab bid x 1 week, then 1/2 daily x 1 week, then stop. Call if does not tolerate.  Bp check after on lisinopril x 2 weeks.

## 2024-03-19 ENCOUNTER — TELEPHONE (OUTPATIENT)
Dept: FAMILY MEDICINE CLINIC | Age: 64
End: 2024-03-19

## 2024-03-19 RX ORDER — BUSPIRONE HYDROCHLORIDE 5 MG/1
5 TABLET ORAL 2 TIMES DAILY
Qty: 60 TABLET | Refills: 0 | Status: SHIPPED | OUTPATIENT
Start: 2024-03-19 | End: 2024-04-18

## 2024-03-19 NOTE — TELEPHONE ENCOUNTER
Pt informed, very emotional and is scheduled to see you tomorrow but did state she would try the Buspar, script pending

## 2024-03-19 NOTE — TELEPHONE ENCOUNTER
We had previously lowered her Paxil dose.  She does have Xanax at home that she can take on an as-needed basis.  Given ongoing issues, recommend add BuSpar 5 mg twice daily.  Follow-up in 1 to 2 weeks

## 2024-03-19 NOTE — TELEPHONE ENCOUNTER
Pt called and said her anxiety is really bad today and was wanting to see you but you are completley booked so she is wondering if there is anything she can do or that you can rec?

## 2024-03-20 ENCOUNTER — OFFICE VISIT (OUTPATIENT)
Dept: FAMILY MEDICINE CLINIC | Age: 64
End: 2024-03-20
Payer: COMMERCIAL

## 2024-03-20 ENCOUNTER — TELEPHONE (OUTPATIENT)
Dept: FAMILY MEDICINE CLINIC | Age: 64
End: 2024-03-20

## 2024-03-20 VITALS
HEIGHT: 62 IN | WEIGHT: 207 LBS | HEART RATE: 78 BPM | OXYGEN SATURATION: 96 % | SYSTOLIC BLOOD PRESSURE: 144 MMHG | BODY MASS INDEX: 38.09 KG/M2 | DIASTOLIC BLOOD PRESSURE: 86 MMHG

## 2024-03-20 DIAGNOSIS — F32.A DEPRESSION, UNSPECIFIED DEPRESSION TYPE: ICD-10-CM

## 2024-03-20 DIAGNOSIS — F41.9 ANXIETY: Primary | ICD-10-CM

## 2024-03-20 PROCEDURE — 99214 OFFICE O/P EST MOD 30 MIN: CPT | Performed by: FAMILY MEDICINE

## 2024-03-20 RX ORDER — ARIPIPRAZOLE 2 MG/1
2 TABLET ORAL DAILY
Qty: 30 TABLET | Refills: 1
Start: 2024-03-20

## 2024-03-20 RX ORDER — ARIPIPRAZOLE 2 MG/1
2 TABLET ORAL DAILY
Qty: 30 TABLET | Refills: 1 | Status: SHIPPED | OUTPATIENT
Start: 2024-03-20 | End: 2024-03-20 | Stop reason: ALTCHOICE

## 2024-03-20 RX ORDER — PAROXETINE HYDROCHLORIDE 40 MG/1
40 TABLET, FILM COATED ORAL EVERY MORNING
Qty: 30 TABLET | Refills: 3 | Status: SHIPPED | OUTPATIENT
Start: 2024-03-20

## 2024-03-20 NOTE — TELEPHONE ENCOUNTER
Patient is being tapered off of metoprolol.  She is on a very low dose.  The drug to drug interaction is the potential for blood pressure being too low, which I think is very unlikely with this combination given she is being tapered off metoprolol anyway.  Regarding the Paxil, the recommendation is to reduce the Abilify dose.  She is already being prescribed the lowest dose.  There is also typically not a contraindication when it is being added on as adjunct therapy for depression.  That being said,  I will try sending another medication that has less concern for drug to drug interaction.

## 2024-03-20 NOTE — PROGRESS NOTES
Chief Complaint   Patient presents with    Anxiety    Depression       HPI:  Christina Alfaro is a 63 y.o. (: 1960) here today   for issues with anxiety and depression.  HPI  Has been doing meditation, walking, being in the sun, others.  Recurrent sxs.  Sig anxiety  noted.  Some depression as well.  Wakes up trembling as well.  Sxs overall seems worse after taking metoprolol.  Timing seems to be after taking metoprolol.  Approx 45-60 min after taking med.  Called psychology on Monday.  Has not been able to get in w/ therapist yet. Has had diarrhea as well. Had previously lowered paxil dose, but not doing as well.  Not much benefit w/ buspar in the past.  Had similar episode over 20 yrs ago.  Had been on mood stabilizer in the remote past which seemed to help.  Xanax does help symptoms.      Patient's medications, allergies, past medical, surgical, social and family histories were reviewed and updated as appropriate.    ROS:  Review of Systems   Constitutional:  Negative for fever.   Cardiovascular:  Negative for palpitations.   Psychiatric/Behavioral:  Positive for decreased concentration, dysphoric mood and sleep disturbance. The patient is nervous/anxious.            A1c (no units)   Date Value   2011 5.8     LDL Calculated (mg/dL)   Date Value   2022 159 (H)       Past Medical History:   Diagnosis Date    Anxiety     Esophageal reflux     Fibromyalgia     GERD (gastroesophageal reflux disease)     Hydronephrosis 2020    Unspecified sleep apnea     cpap       Family History   Problem Relation Age of Onset    Arthritis Mother     Asthma Mother     High Blood Pressure Mother     Heart Disease Sister     Heart Disease Father     Cancer Sister         breast       Social History     Socioeconomic History    Marital status:      Spouse name: Not on file    Number of children: Not on file    Years of education: Not on file    Highest education level: Not on file   Occupational History    Not on

## 2024-03-20 NOTE — TELEPHONE ENCOUNTER
Pt called and said the pharmacy told her she couldn't take the Abilify with Paxil or hypertensive medication.

## 2024-03-21 NOTE — TELEPHONE ENCOUNTER
Patient spoke to Dr. Donaldson after hours and they discussed the medication. I Dr. Donaldson wants any changes now just to let her know

## 2024-03-28 DIAGNOSIS — F41.9 ANXIETY: ICD-10-CM

## 2024-03-28 RX ORDER — ALPRAZOLAM 0.25 MG/1
0.25 TABLET ORAL NIGHTLY PRN
Qty: 20 TABLET | Refills: 0 | Status: SHIPPED | OUTPATIENT
Start: 2024-03-28 | End: 2024-04-27

## 2024-04-10 ENCOUNTER — PATIENT MESSAGE (OUTPATIENT)
Dept: FAMILY MEDICINE CLINIC | Age: 64
End: 2024-04-10

## 2024-04-11 NOTE — TELEPHONE ENCOUNTER
From: Christina Alfaro  To: Dr. Reese Donaldson  Sent: 4/10/2024 7:43 PM EDT  Subject: Abilify and shaking    Dr. Donaldson, I am concerned about the abilify causing me to shake really bad. I can't stop it. My whole body is trembling. I've never had this happen with paxil so I'm guessing it's the abilify. This has been going on for awhile now. Please help ASAP. Thank you, Christina

## 2024-04-11 NOTE — TELEPHONE ENCOUNTER
It is possible that it is the combination of the Paxil and Abilify, as well is tapering off of metoprolol.  If she off of metoprolol completely at this point?  Has Abilify helped her other mood symptoms?

## 2024-04-11 NOTE — TELEPHONE ENCOUNTER
If no improvement with Abilify, recommend discontinuing this medication.  Continue Paxil for now.  If shaking improves, then we can explore other medication options such as Vraylar or other medications.

## 2024-04-24 ENCOUNTER — COMMUNITY OUTREACH (OUTPATIENT)
Dept: FAMILY MEDICINE CLINIC | Age: 64
End: 2024-04-24

## 2024-04-24 NOTE — PROGRESS NOTES
Patient's HM shows they are overdue for Colorectal and Mammogram Screening.   Care Everywhere and  files searched.  No results to attach to order nor HM updated. Pt has considering CRS.

## 2024-07-23 ENCOUNTER — OFFICE VISIT (OUTPATIENT)
Dept: FAMILY MEDICINE CLINIC | Age: 64
End: 2024-07-23
Payer: COMMERCIAL

## 2024-07-23 VITALS
OXYGEN SATURATION: 97 % | HEART RATE: 84 BPM | WEIGHT: 206.4 LBS | DIASTOLIC BLOOD PRESSURE: 76 MMHG | HEIGHT: 62 IN | SYSTOLIC BLOOD PRESSURE: 108 MMHG | BODY MASS INDEX: 37.98 KG/M2

## 2024-07-23 DIAGNOSIS — R73.09 ABNORMAL GLUCOSE: ICD-10-CM

## 2024-07-23 DIAGNOSIS — F32.A DEPRESSION, UNSPECIFIED DEPRESSION TYPE: ICD-10-CM

## 2024-07-23 DIAGNOSIS — F41.9 ANXIETY: Primary | ICD-10-CM

## 2024-07-23 PROCEDURE — 99213 OFFICE O/P EST LOW 20 MIN: CPT | Performed by: FAMILY MEDICINE

## 2024-07-23 RX ORDER — PAROXETINE HYDROCHLORIDE 40 MG/1
40 TABLET, FILM COATED ORAL EVERY MORNING
Qty: 90 TABLET | Refills: 3 | Status: SHIPPED | OUTPATIENT
Start: 2024-07-23

## 2024-07-23 ASSESSMENT — ENCOUNTER SYMPTOMS: SHORTNESS OF BREATH: 0

## 2024-07-23 NOTE — PROGRESS NOTES
Chief Complaint   Patient presents with    Check-Up     4 month        HPI:  Christina Alfaro is a 63 y.o. (: 1960) here today   for 4 month check up.  HPI  Still having issues w/ anxiety, amanda in am.  Improves as day progresses.  Takes paxil at night.  Had sig relief w/ acupuncture.  Has helped.  Has been able to space out those visits.  Has been taking magnesium bid.  Seems to have helped.  Anxiety seemed to start when was perimenopausal.      Patient's medications, allergies, past medical, surgical, social and family histories were reviewed and updated as appropriate.    ROS:  Review of Systems   Constitutional:  Negative for fever.   Respiratory:  Negative for shortness of breath.    Cardiovascular:  Negative for chest pain and palpitations.   Psychiatric/Behavioral:  Positive for dysphoric mood and sleep disturbance. The patient is nervous/anxious.            A1c (no units)   Date Value   2011 5.8       Past Medical History:   Diagnosis Date    Anxiety     Esophageal reflux     Fibromyalgia     GERD (gastroesophageal reflux disease)     Hydronephrosis 2020    Unspecified sleep apnea     cpap       Family History   Problem Relation Age of Onset    Arthritis Mother     Asthma Mother     High Blood Pressure Mother     Heart Disease Sister     Heart Disease Father     Cancer Sister         breast       Social History     Socioeconomic History    Marital status:      Spouse name: Not on file    Number of children: Not on file    Years of education: Not on file    Highest education level: Not on file   Occupational History    Not on file   Tobacco Use    Smoking status: Never    Smokeless tobacco: Never   Substance and Sexual Activity    Alcohol use: No    Drug use: No    Sexual activity: Yes   Other Topics Concern    Not on file   Social History Narrative    ** Merged History Encounter **          Social Determinants of Health     Financial Resource Strain: Medium Risk (2023)    Overall

## 2024-07-24 ENCOUNTER — LAB (OUTPATIENT)
Dept: FAMILY MEDICINE CLINIC | Age: 64
End: 2024-07-24
Payer: COMMERCIAL

## 2024-07-24 DIAGNOSIS — F32.A DEPRESSION, UNSPECIFIED DEPRESSION TYPE: ICD-10-CM

## 2024-07-24 DIAGNOSIS — F41.9 ANXIETY: ICD-10-CM

## 2024-07-24 DIAGNOSIS — R73.09 ABNORMAL GLUCOSE: ICD-10-CM

## 2024-07-24 LAB
EST. AVERAGE GLUCOSE BLD GHB EST-MCNC: 102.5 MG/DL
FSH SERPL-ACNC: 57.3 MIU/ML
HBA1C MFR BLD: 5.2 %
LH SERPL-ACNC: 37.7 MIU/ML

## 2024-07-24 PROCEDURE — 36415 COLL VENOUS BLD VENIPUNCTURE: CPT | Performed by: FAMILY MEDICINE

## 2024-07-24 NOTE — PROGRESS NOTES
Blood drawn per order.  Needle size: 21 g  Site: R Antecubital.  First attempt successful Yes    Second attempt no    Pressure applied until bleeding stopped.  Band Aid applied.    Patient informed to call office or return if bleeding reoccurs and unable to stop.    Tubes drawn: 1 purple     3 red

## 2024-07-25 NOTE — RESULT ENCOUNTER NOTE
Hemoglobin A1c normal at 5.2.  LH and FSH in the range of postmenopausal as expected.  Testosterone and estrogen level still pending.

## 2024-07-26 LAB
SHBG SERPL-SCNC: 28 NMOL/L (ref 17–125)
TESTOST FREE SERPL-MCNC: 4.3 PG/ML (ref 0.6–3.8)
TESTOST SERPL-MCNC: 22 NG/DL (ref 3–41)

## 2024-07-28 LAB
ESTRADIOL SERPL HS-MCNC: 11.1 PG/ML
ESTROGEN SERPL CALC-MCNC: 49.7 PG/ML
ESTRONE SERPL-MCNC: 38.6 PG/ML

## 2024-10-17 ENCOUNTER — TELEPHONE (OUTPATIENT)
Dept: FAMILY MEDICINE CLINIC | Age: 64
End: 2024-10-17

## 2024-10-17 NOTE — TELEPHONE ENCOUNTER
Wellness check called in to local police department for patient. During phone call patient was hysterically crying about her anxiety and depression. When asked if she felt like she might harm herself she said \"I don't know, I just don't want to feel this way anymore.\" Refused to take herself to the emergency room.    Patient put on next available appointment. Stated she has some xanax left and took one but did not want to take them long term anymore.     I also placed a phone call with this information to her emergency contact, her  Malcolm. He stated she has been having a rough few days, and has spoken with her multiple times today. Was over an hour away during our phone call but stated he was leaving at that time to get home. I advised him to take her to the emergency room to be treated and watched. He said he would try to do that. I also let him know I placed the well check with the police. Which he was agreeable to.

## 2024-10-19 ENCOUNTER — APPOINTMENT (OUTPATIENT)
Dept: GENERAL RADIOLOGY | Age: 64
End: 2024-10-19
Payer: COMMERCIAL

## 2024-10-19 ENCOUNTER — HOSPITAL ENCOUNTER (EMERGENCY)
Age: 64
Discharge: HOME | End: 2024-10-19
Attending: EMERGENCY MEDICINE
Payer: COMMERCIAL

## 2024-10-19 VITALS
RESPIRATION RATE: 22 BRPM | OXYGEN SATURATION: 98 % | HEIGHT: 62 IN | SYSTOLIC BLOOD PRESSURE: 168 MMHG | TEMPERATURE: 98.2 F | BODY MASS INDEX: 36.95 KG/M2 | HEART RATE: 110 BPM | DIASTOLIC BLOOD PRESSURE: 87 MMHG | WEIGHT: 200.8 LBS

## 2024-10-19 DIAGNOSIS — E87.6 HYPOKALEMIA: ICD-10-CM

## 2024-10-19 DIAGNOSIS — F41.1 ANXIETY STATE: Primary | ICD-10-CM

## 2024-10-19 LAB
ALBUMIN SERPL-MCNC: 4.5 G/DL (ref 3.4–5)
ALBUMIN/GLOB SERPL: 1.3 {RATIO} (ref 1.1–2.2)
ALP SERPL-CCNC: 115 U/L (ref 40–129)
ALT SERPL-CCNC: 12 U/L (ref 10–40)
AMPHETAMINES UR QL SCN>1000 NG/ML: ABNORMAL
ANION GAP SERPL CALCULATED.3IONS-SCNC: 20 MMOL/L (ref 3–16)
APAP SERPL-MCNC: <5 UG/ML (ref 10–30)
AST SERPL-CCNC: 16 U/L (ref 15–37)
BARBITURATES UR QL SCN>200 NG/ML: ABNORMAL
BASOPHILS # BLD: 0.1 K/UL (ref 0–0.2)
BASOPHILS NFR BLD: 0.7 %
BENZODIAZ UR QL SCN>200 NG/ML: ABNORMAL
BILIRUB SERPL-MCNC: 0.6 MG/DL (ref 0–1)
BILIRUB UR QL STRIP.AUTO: ABNORMAL
BUN SERPL-MCNC: 13 MG/DL (ref 7–20)
CALCIUM SERPL-MCNC: 9.3 MG/DL (ref 8.3–10.6)
CANNABINOIDS UR QL SCN>50 NG/ML: POSITIVE
CHLORIDE SERPL-SCNC: 100 MMOL/L (ref 99–110)
CLARITY UR: CLEAR
CO2 SERPL-SCNC: 21 MMOL/L (ref 21–32)
COCAINE UR QL SCN: ABNORMAL
COLOR UR: YELLOW
CREAT SERPL-MCNC: 0.6 MG/DL (ref 0.6–1.2)
DEPRECATED RDW RBC AUTO: 13.9 % (ref 12.4–15.4)
DRUG SCREEN COMMENT UR-IMP: ABNORMAL
EOSINOPHIL # BLD: 0.1 K/UL (ref 0–0.6)
EOSINOPHIL NFR BLD: 0.6 %
ETHANOLAMINE SERPL-MCNC: NORMAL MG/DL (ref 0–0.08)
FENTANYL SCREEN, URINE: ABNORMAL
GFR SERPLBLD CREATININE-BSD FMLA CKD-EPI: >90 ML/MIN/{1.73_M2}
GLUCOSE SERPL-MCNC: 84 MG/DL (ref 70–99)
GLUCOSE UR STRIP.AUTO-MCNC: NEGATIVE MG/DL
HCT VFR BLD AUTO: 42.4 % (ref 36–48)
HGB BLD-MCNC: 14.4 G/DL (ref 12–16)
HGB UR QL STRIP.AUTO: NEGATIVE
KETONES UR STRIP.AUTO-MCNC: >=80 MG/DL
LEUKOCYTE ESTERASE UR QL STRIP.AUTO: NEGATIVE
LYMPHOCYTES # BLD: 1.7 K/UL (ref 1–5.1)
LYMPHOCYTES NFR BLD: 19.5 %
MAGNESIUM SERPL-MCNC: 2 MG/DL (ref 1.8–2.4)
MCH RBC QN AUTO: 29.9 PG (ref 26–34)
MCHC RBC AUTO-ENTMCNC: 33.9 G/DL (ref 31–36)
MCV RBC AUTO: 88.2 FL (ref 80–100)
METHADONE UR QL SCN>300 NG/ML: ABNORMAL
MONOCYTES # BLD: 0.8 K/UL (ref 0–1.3)
MONOCYTES NFR BLD: 8.7 %
NEUTROPHILS # BLD: 6.2 K/UL (ref 1.7–7.7)
NEUTROPHILS NFR BLD: 70.5 %
NITRITE UR QL STRIP.AUTO: NEGATIVE
NT-PROBNP SERPL-MCNC: 41 PG/ML (ref 0–124)
OPIATES UR QL SCN>300 NG/ML: ABNORMAL
OXYCODONE UR QL SCN: ABNORMAL
PCP UR QL SCN>25 NG/ML: ABNORMAL
PH UR STRIP.AUTO: 6 [PH] (ref 5–8)
PH UR STRIP: 6 [PH]
PLATELET # BLD AUTO: 248 K/UL (ref 135–450)
PMV BLD AUTO: 8 FL (ref 5–10.5)
POTASSIUM SERPL-SCNC: 3.3 MMOL/L (ref 3.5–5.1)
PROT SERPL-MCNC: 8 G/DL (ref 6.4–8.2)
PROT UR STRIP.AUTO-MCNC: NEGATIVE MG/DL
RBC # BLD AUTO: 4.81 M/UL (ref 4–5.2)
SALICYLATES SERPL-MCNC: <0.3 MG/DL (ref 15–30)
SODIUM SERPL-SCNC: 141 MMOL/L (ref 136–145)
SP GR UR STRIP.AUTO: 1.02 (ref 1–1.03)
TROPONIN, HIGH SENSITIVITY: 7 NG/L (ref 0–14)
TROPONIN, HIGH SENSITIVITY: 7 NG/L (ref 0–14)
UA COMPLETE W REFLEX CULTURE PNL UR: ABNORMAL
UA DIPSTICK W REFLEX MICRO PNL UR: ABNORMAL
URN SPEC COLLECT METH UR: ABNORMAL
UROBILINOGEN UR STRIP-ACNC: 0.2 E.U./DL
WBC # BLD AUTO: 8.8 K/UL (ref 4–11)

## 2024-10-19 PROCEDURE — 81003 URINALYSIS AUTO W/O SCOPE: CPT

## 2024-10-19 PROCEDURE — 80307 DRUG TEST PRSMV CHEM ANLYZR: CPT

## 2024-10-19 PROCEDURE — 80143 DRUG ASSAY ACETAMINOPHEN: CPT

## 2024-10-19 PROCEDURE — 83880 ASSAY OF NATRIURETIC PEPTIDE: CPT

## 2024-10-19 PROCEDURE — 71046 X-RAY EXAM CHEST 2 VIEWS: CPT

## 2024-10-19 PROCEDURE — 6370000000 HC RX 637 (ALT 250 FOR IP): Performed by: REGISTERED NURSE

## 2024-10-19 PROCEDURE — 80053 COMPREHEN METABOLIC PANEL: CPT

## 2024-10-19 PROCEDURE — 85025 COMPLETE CBC W/AUTO DIFF WBC: CPT

## 2024-10-19 PROCEDURE — 93005 ELECTROCARDIOGRAM TRACING: CPT | Performed by: REGISTERED NURSE

## 2024-10-19 PROCEDURE — 82077 ASSAY SPEC XCP UR&BREATH IA: CPT

## 2024-10-19 PROCEDURE — 90792 PSYCH DIAG EVAL W/MED SRVCS: CPT | Performed by: REGISTERED NURSE

## 2024-10-19 PROCEDURE — 84484 ASSAY OF TROPONIN QUANT: CPT

## 2024-10-19 PROCEDURE — 83735 ASSAY OF MAGNESIUM: CPT

## 2024-10-19 PROCEDURE — 99285 EMERGENCY DEPT VISIT HI MDM: CPT

## 2024-10-19 PROCEDURE — 80179 DRUG ASSAY SALICYLATE: CPT

## 2024-10-19 RX ORDER — ASCORBIC ACID
250 CRYSTALS ORAL DAILY
COMMUNITY

## 2024-10-19 RX ORDER — ACETAMINOPHEN 160 MG
2000 TABLET,DISINTEGRATING ORAL DAILY
COMMUNITY

## 2024-10-19 RX ORDER — LORAZEPAM 0.5 MG/1
0.5 TABLET ORAL EVERY 8 HOURS PRN
Qty: 9 TABLET | Refills: 0 | Status: SHIPPED | OUTPATIENT
Start: 2024-10-19 | End: 2024-10-22

## 2024-10-19 RX ORDER — THIAMINE MONONITRATE (VIT B1) 100 MG
100 TABLET ORAL DAILY
COMMUNITY

## 2024-10-19 RX ORDER — HYDROXYZINE HYDROCHLORIDE 50 MG/1
50 TABLET, FILM COATED ORAL ONCE
Status: COMPLETED | OUTPATIENT
Start: 2024-10-19 | End: 2024-10-19

## 2024-10-19 RX ORDER — POTASSIUM CHLORIDE 1500 MG/1
20 TABLET, EXTENDED RELEASE ORAL ONCE
Status: COMPLETED | OUTPATIENT
Start: 2024-10-19 | End: 2024-10-19

## 2024-10-19 RX ADMIN — HYDROXYZINE HYDROCHLORIDE 50 MG: 50 TABLET, FILM COATED ORAL at 21:02

## 2024-10-19 RX ADMIN — POTASSIUM CHLORIDE 20 MEQ: 1500 TABLET, EXTENDED RELEASE ORAL at 22:53

## 2024-10-19 ASSESSMENT — ENCOUNTER SYMPTOMS
SHORTNESS OF BREATH: 0
CHEST TIGHTNESS: 0
ABDOMINAL PAIN: 0

## 2024-10-19 ASSESSMENT — LIFESTYLE VARIABLES
HOW MANY STANDARD DRINKS CONTAINING ALCOHOL DO YOU HAVE ON A TYPICAL DAY: PATIENT DOES NOT DRINK
HOW OFTEN DO YOU HAVE A DRINK CONTAINING ALCOHOL: NEVER

## 2024-10-20 LAB
EKG ATRIAL RATE: 91 BPM
EKG DIAGNOSIS: NORMAL
EKG P AXIS: 56 DEGREES
EKG P-R INTERVAL: 134 MS
EKG Q-T INTERVAL: 376 MS
EKG QRS DURATION: 84 MS
EKG QTC CALCULATION (BAZETT): 462 MS
EKG R AXIS: -10 DEGREES
EKG T AXIS: 45 DEGREES
EKG VENTRICULAR RATE: 91 BPM

## 2024-10-20 PROCEDURE — 93010 ELECTROCARDIOGRAM REPORT: CPT | Performed by: INTERNAL MEDICINE

## 2024-10-20 NOTE — VIRTUAL HEALTH
via Infectious if anything more is required.     Electronically signed by JOHNNY Johnson CNP on 10/19/2024 at 9:59 PM.    END OF NOTE  -------------------------      Christina Alfaro, was evaluated through a synchronous (real-time) audio-video encounter. The patient (and/or guardian if applicable) is aware that this is a billable service, which includes applicable co-pays. This virtual visit was conducted with patient's (and/or legal guardian's) consent. Patient identification was verified, and a caregiver was present when appropriate.  The patient was located at Facility (Appt Department): [facility]  [address]  [place] [id number]  Loc: [phone]  The provider was located at Home (City/State): [facility]  Confirm you are appropriately licensed, registered, or certified to deliver care in the state where the patient is located as indicated above. If you are not or unsure, please re-schedule the visit: Yes, I confirm.   Neon Consult to Tele-Psych  Consult performed by: Raul Elmore APRN - CNP  Consult ordered by: Sindi Busch APRN - CNP  Reason for consult: psych eval           Total time spent on this encounter:  990 minutes    --JOHNNY Johnson CNP on 10/19/2024 at 9:59 PM    An electronic signature was used to authenticate this note.

## 2024-10-20 NOTE — ED NOTES
Patient alert at time of d/c, no s/s distress. Reviewed AVS, prescriptions, and follow up care with patient. No questions/concerns at this time. Patient states she is feeling better at this time. Ambulated to exit with  without difficulty.

## 2024-10-20 NOTE — ED NOTES
Patient resting in bed, tearful but pleasant and cooperative. Patient denies SI/HI, confirms she is here due to anti depressant no longer working. Patient states she been doing non pharm interventions since May including therapy and meditation with no relief.

## 2024-10-20 NOTE — DISCHARGE INSTRUCTIONS
Follow-up with primary care provider on Monday as well as initiate intake appointment with in network psychiatrist.    On the back of the insurance card should be a behavioral health customer service line call this number and ask for psychiatrist and therapist in network in your local area.    Benefits may be found from EMDR therapy; when calling in network counseling centers to schedule initial intake appointment ask if they have a therapist or therapists certified in EMDR therapy.    What is EMDR therapy?  Eye movement desensitization and reprocessing (EMDR) therapy is a mental health treatment technique. This method involves moving your eyes a specific way while you process traumatic memories. EMDR’s goal is to help you heal from trauma or other distressing life experiences. Compared to other therapy methods, EMDR is relatively new. The first clinical trial investigating EMDR was in 1989. Dozens of clinical trials since EMDR’s development show this technique is effective and can help a person faster than many other methods.

## 2024-10-20 NOTE — ED PROVIDER NOTES
[facility] ED  EMERGENCY DEPARTMENT ENCOUNTER        Pt Name: Christina Alfaro  MRN: [id number]  Birthdate 1960  Date of evaluation: 10/19/2024  Provider: JOHNNY Gibson - CNP  PCP: Reese Donaldson MD    This patient was seen and evaluated by the attending physician Dr. Salazar.    I have evaluated this patient. My supervising physician was available for consultation.      CHIEF COMPLAINT       Chief Complaint   Patient presents with    Anxiety     Pt comes here with a acute attack of Anxiety and Depression. Pt has Hx of it, on Paxil which was recently increased in May by her PCP. Pt states medicine is not working. Pt very tearful and state she needs help. Denies SI.        HISTORY OF PRESENT ILLNESS   (Location/Symptom, Timing/Onset, Context/Setting, Quality, Duration, Modifying Factors, Severity)  Note limiting factors.     History from : Patient  Christina Alfaro is a 64 y.o. female who presents via private car for evaluation of increasing anxiety.  Patient presents to the ER this evening stating that she has been taking Paxil for 23 years and in May she met with her doctor to increase her Paxil dose from 20 mg to 40 mg.  He also gave her a prescription for as needed lorazepam at that time which she does not feel has been helping, she does not like taking it stating that it actually makes her feel more anxious and tearful.  Patient states over the last 3 days she has been having increased anxiety with palpitations increasing tearfulness she states that her normal coping mechanisms such as exercising meditation have not been helping.  She denies thoughts of wanting to harm herself or others.  She does have an appointment with her PCP on Monday to evaluate this and look at medication adjustments.  She denies chest pain or shortness of breath.  She denies dizziness or lightheadedness.  She denies abdominal pain or urinary symptoms.  She denies any thoughts of wanting to harm herself or

## 2024-10-21 ENCOUNTER — OFFICE VISIT (OUTPATIENT)
Dept: FAMILY MEDICINE CLINIC | Age: 64
End: 2024-10-21
Payer: COMMERCIAL

## 2024-10-21 VITALS
WEIGHT: 200.4 LBS | HEART RATE: 99 BPM | OXYGEN SATURATION: 97 % | DIASTOLIC BLOOD PRESSURE: 90 MMHG | BODY MASS INDEX: 36.88 KG/M2 | SYSTOLIC BLOOD PRESSURE: 142 MMHG | HEIGHT: 62 IN

## 2024-10-21 DIAGNOSIS — F33.1 MODERATE EPISODE OF RECURRENT MAJOR DEPRESSIVE DISORDER (HCC): ICD-10-CM

## 2024-10-21 DIAGNOSIS — F41.9 ANXIETY: Primary | ICD-10-CM

## 2024-10-21 PROCEDURE — 99214 OFFICE O/P EST MOD 30 MIN: CPT | Performed by: FAMILY MEDICINE

## 2024-10-21 RX ORDER — LORAZEPAM 0.5 MG/1
0.5 TABLET ORAL EVERY 8 HOURS PRN
Qty: 30 TABLET | Refills: 0 | Status: SHIPPED | OUTPATIENT
Start: 2024-10-21 | End: 2024-11-20

## 2024-10-21 SDOH — ECONOMIC STABILITY: FOOD INSECURITY: WITHIN THE PAST 12 MONTHS, THE FOOD YOU BOUGHT JUST DIDN'T LAST AND YOU DIDN'T HAVE MONEY TO GET MORE.: NEVER TRUE

## 2024-10-21 SDOH — ECONOMIC STABILITY: FOOD INSECURITY: WITHIN THE PAST 12 MONTHS, YOU WORRIED THAT YOUR FOOD WOULD RUN OUT BEFORE YOU GOT MONEY TO BUY MORE.: SOMETIMES TRUE

## 2024-10-21 SDOH — ECONOMIC STABILITY: INCOME INSECURITY: HOW HARD IS IT FOR YOU TO PAY FOR THE VERY BASICS LIKE FOOD, HOUSING, MEDICAL CARE, AND HEATING?: NOT VERY HARD

## 2024-10-21 ASSESSMENT — ANXIETY QUESTIONNAIRES
7. FEELING AFRAID AS IF SOMETHING AWFUL MIGHT HAPPEN: NEARLY EVERY DAY
3. WORRYING TOO MUCH ABOUT DIFFERENT THINGS: NEARLY EVERY DAY
GAD7 TOTAL SCORE: 20
IF YOU CHECKED OFF ANY PROBLEMS ON THIS QUESTIONNAIRE, HOW DIFFICULT HAVE THESE PROBLEMS MADE IT FOR YOU TO DO YOUR WORK, TAKE CARE OF THINGS AT HOME, OR GET ALONG WITH OTHER PEOPLE: EXTREMELY DIFFICULT
6. BECOMING EASILY ANNOYED OR IRRITABLE: MORE THAN HALF THE DAYS
2. NOT BEING ABLE TO STOP OR CONTROL WORRYING: NEARLY EVERY DAY
1. FEELING NERVOUS, ANXIOUS, OR ON EDGE: NEARLY EVERY DAY
4. TROUBLE RELAXING: NEARLY EVERY DAY
5. BEING SO RESTLESS THAT IT IS HARD TO SIT STILL: NEARLY EVERY DAY

## 2024-10-21 ASSESSMENT — COLUMBIA-SUICIDE SEVERITY RATING SCALE - C-SSRS
6. HAVE YOU EVER DONE ANYTHING, STARTED TO DO ANYTHING, OR PREPARED TO DO ANYTHING TO END YOUR LIFE?: NO
1. WITHIN THE PAST MONTH, HAVE YOU WISHED YOU WERE DEAD OR WISHED YOU COULD GO TO SLEEP AND NOT WAKE UP?: YES
2. HAVE YOU ACTUALLY HAD ANY THOUGHTS OF KILLING YOURSELF?: NO

## 2024-10-21 ASSESSMENT — PATIENT HEALTH QUESTIONNAIRE - PHQ9
SUM OF ALL RESPONSES TO PHQ QUESTIONS 1-9: 23
3. TROUBLE FALLING OR STAYING ASLEEP: NEARLY EVERY DAY
5. POOR APPETITE OR OVEREATING: NEARLY EVERY DAY
SUM OF ALL RESPONSES TO PHQ QUESTIONS 1-9: 21
2. FEELING DOWN, DEPRESSED OR HOPELESS: NEARLY EVERY DAY
SUM OF ALL RESPONSES TO PHQ QUESTIONS 1-9: 23
6. FEELING BAD ABOUT YOURSELF - OR THAT YOU ARE A FAILURE OR HAVE LET YOURSELF OR YOUR FAMILY DOWN: NEARLY EVERY DAY
8. MOVING OR SPEAKING SO SLOWLY THAT OTHER PEOPLE COULD HAVE NOTICED. OR THE OPPOSITE, BEING SO FIGETY OR RESTLESS THAT YOU HAVE BEEN MOVING AROUND A LOT MORE THAN USUAL: NOT AT ALL
9. THOUGHTS THAT YOU WOULD BE BETTER OFF DEAD, OR OF HURTING YOURSELF: MORE THAN HALF THE DAYS
1. LITTLE INTEREST OR PLEASURE IN DOING THINGS: NEARLY EVERY DAY
10. IF YOU CHECKED OFF ANY PROBLEMS, HOW DIFFICULT HAVE THESE PROBLEMS MADE IT FOR YOU TO DO YOUR WORK, TAKE CARE OF THINGS AT HOME, OR GET ALONG WITH OTHER PEOPLE: EXTREMELY DIFFICULT
SUM OF ALL RESPONSES TO PHQ9 QUESTIONS 1 & 2: 6
SUM OF ALL RESPONSES TO PHQ QUESTIONS 1-9: 23
4. FEELING TIRED OR HAVING LITTLE ENERGY: NEARLY EVERY DAY
7. TROUBLE CONCENTRATING ON THINGS, SUCH AS READING THE NEWSPAPER OR WATCHING TELEVISION: NEARLY EVERY DAY

## 2024-10-21 NOTE — PROGRESS NOTES
Chief Complaint   Patient presents with    Anxiety     Was in the ER 10/29/2024 for anxiety state    Depression       HPI:  Christina Alfaro is a 64 y.o. (: 1960) here today   for   Anxiety  Symptoms include nervous/anxious behavior.       DepressionPatient presents with the following symptoms: nervousness/anxiety.        Has had worsening issues w/ mood and anxiety.  Has tried thc gummies.  Acupuncture no longer working.  Tried prn xanax, inc tearfulness.  Paxil no longer helping much.  Was seen by GYN.  Did not feel hormone related.  Had diff affording psychology in the past.  Hydroxyzine in ER helped w/ anxiety.  Was given rx for ativan in ER.  CNP w/ psychiatry saw her virtually while in ER.        Had been having some ongoing issues, but sig worse since last Thursday.      Took ativan last night and this am.  Does seem to help somewhat.  Some diff w/ enjoyment, dec motivation noted.  Uncontrollable crying and shaking noted.     Patient's medications, allergies, past medical, surgical, social and family histories were reviewed and updated as appropriate.    ROS:  Review of Systems   Constitutional:  Negative for fever.   Psychiatric/Behavioral:  Positive for depression and dysphoric mood. The patient is nervous/anxious.            Hemoglobin A1C (%)   Date Value   2024 5.2       Past Medical History:   Diagnosis Date    Anxiety     Esophageal reflux     Fibromyalgia     GERD (gastroesophageal reflux disease)     Hydronephrosis 2020    Unspecified sleep apnea     cpap       Family History   Problem Relation Age of Onset    Arthritis Mother     Asthma Mother     High Blood Pressure Mother     Heart Disease Sister     Heart Disease Father     Cancer Sister         breast       Social History     Socioeconomic History    Marital status:      Spouse name: Not on file    Number of children: Not on file    Years of education: Not on file    Highest education level: Not on file   Occupational History

## 2024-10-28 ENCOUNTER — TELEPHONE (OUTPATIENT)
Dept: FAMILY MEDICINE CLINIC | Age: 64
End: 2024-10-28

## 2024-10-28 NOTE — TELEPHONE ENCOUNTER
Pt called and was asking about the app2you testing she said you were going to order. I see it in your note but do not know if anything was sent. Please advise.

## 2024-11-07 ENCOUNTER — LAB (OUTPATIENT)
Dept: FAMILY MEDICINE CLINIC | Age: 64
End: 2024-11-07

## 2024-11-11 NOTE — RESULT ENCOUNTER NOTE
Patient had been on multiple medications in the past.  Previously had been on Paxil.  Most recently been on Vraylar.  I believe samples were provided from the office.  Did not see much benefit with BuSpar in the past.  How is she doing with the Vraylar?      Based on [facility] testing, Wellbutrin, Pristiq, or Viibryd may be options regarding mood.  Unsure which of those medicines she may have been on in the past.  Lexapro or Celexa may be an option as well.  Seems that the likelihood of side effects with those medicines would be much lower than with Paxil, Effexor, Trintellix, others.    From an anxiety perspective, BuSpar would be an option.  She did not see much benefit with it in the past, but may be able to try higher doses if becomes necessary     Also based on [facility]ight.  Vraylar does appear to be a good option from a mood stabilization standpoint.  It does appear there could be side effects with Abilify, which she had some issues with in the past.    Any update on psychiatry referral?

## 2024-11-12 DIAGNOSIS — F33.1 MODERATE EPISODE OF RECURRENT MAJOR DEPRESSIVE DISORDER (HCC): Primary | ICD-10-CM

## 2024-11-12 RX ORDER — BUPROPION HYDROCHLORIDE 150 MG/1
150 TABLET ORAL EVERY MORNING
Qty: 90 TABLET | Refills: 1 | Status: SHIPPED | OUTPATIENT
Start: 2024-11-12

## 2024-11-12 NOTE — RESULT ENCOUNTER NOTE
Since she has been on Paxil for a significant period of time, okay to continue for now.  Based on [facility] [place], it looks like Paxil is likely less effective and we will likely taper off of that medicine in the future.  Add Wellbutrin  mg daily.  Okay to try other treatments as she is planning.  Hopefully she is able to get in with psychiatry soon.  It appears she already has a follow-up appointment in approximately 2 weeks.

## 2024-11-26 ENCOUNTER — OFFICE VISIT (OUTPATIENT)
Dept: FAMILY MEDICINE CLINIC | Age: 64
End: 2024-11-26

## 2024-11-26 VITALS
HEIGHT: 62 IN | SYSTOLIC BLOOD PRESSURE: 132 MMHG | DIASTOLIC BLOOD PRESSURE: 86 MMHG | BODY MASS INDEX: 37.17 KG/M2 | WEIGHT: 202 LBS | HEART RATE: 94 BPM | OXYGEN SATURATION: 98 %

## 2024-11-26 DIAGNOSIS — F43.10 PTSD (POST-TRAUMATIC STRESS DISORDER): ICD-10-CM

## 2024-11-26 DIAGNOSIS — Z12.31 ENCOUNTER FOR SCREENING MAMMOGRAM FOR BREAST CANCER: ICD-10-CM

## 2024-11-26 DIAGNOSIS — E78.5 HYPERLIPIDEMIA, UNSPECIFIED HYPERLIPIDEMIA TYPE: ICD-10-CM

## 2024-11-26 DIAGNOSIS — E87.0 SERUM SODIUM ELEVATED: Primary | ICD-10-CM

## 2024-11-26 DIAGNOSIS — R53.83 OTHER FATIGUE: ICD-10-CM

## 2024-11-26 DIAGNOSIS — F41.9 ANXIETY: Primary | ICD-10-CM

## 2024-11-26 LAB
ALBUMIN SERPL-MCNC: 4.3 G/DL (ref 3.4–5)
ALBUMIN/GLOB SERPL: 1.5 {RATIO} (ref 1.1–2.2)
ALP SERPL-CCNC: 129 U/L (ref 40–129)
ALT SERPL-CCNC: 25 U/L (ref 10–40)
ANION GAP SERPL CALCULATED.3IONS-SCNC: 14 MMOL/L (ref 3–16)
AST SERPL-CCNC: 26 U/L (ref 15–37)
BASOPHILS # BLD: 0.1 K/UL (ref 0–0.2)
BASOPHILS NFR BLD: 0.9 %
BILIRUB SERPL-MCNC: 0.5 MG/DL (ref 0–1)
BUN SERPL-MCNC: 15 MG/DL (ref 7–20)
CALCIUM SERPL-MCNC: 9.5 MG/DL (ref 8.3–10.6)
CHLORIDE SERPL-SCNC: 112 MMOL/L (ref 99–110)
CHOLEST SERPL-MCNC: 267 MG/DL (ref 0–199)
CO2 SERPL-SCNC: 27 MMOL/L (ref 21–32)
CREAT SERPL-MCNC: 0.7 MG/DL (ref 0.6–1.2)
DEPRECATED RDW RBC AUTO: 14.8 % (ref 12.4–15.4)
EOSINOPHIL # BLD: 0.1 K/UL (ref 0–0.6)
EOSINOPHIL NFR BLD: 2.3 %
GFR SERPLBLD CREATININE-BSD FMLA CKD-EPI: >90 ML/MIN/{1.73_M2}
GLUCOSE SERPL-MCNC: 104 MG/DL (ref 70–99)
HCT VFR BLD AUTO: 45.1 % (ref 36–48)
HDLC SERPL-MCNC: 62 MG/DL (ref 40–60)
HGB BLD-MCNC: 15 G/DL (ref 12–16)
LDLC SERPL CALC-MCNC: 181 MG/DL
LYMPHOCYTES # BLD: 1.2 K/UL (ref 1–5.1)
LYMPHOCYTES NFR BLD: 19.3 %
MAGNESIUM SERPL-MCNC: 2.2 MG/DL (ref 1.8–2.4)
MCH RBC QN AUTO: 29.7 PG (ref 26–34)
MCHC RBC AUTO-ENTMCNC: 33.3 G/DL (ref 31–36)
MCV RBC AUTO: 89.2 FL (ref 80–100)
MONOCYTES # BLD: 0.4 K/UL (ref 0–1.3)
MONOCYTES NFR BLD: 6.7 %
NEUTROPHILS # BLD: 4.4 K/UL (ref 1.7–7.7)
NEUTROPHILS NFR BLD: 70.8 %
PLATELET # BLD AUTO: 285 K/UL (ref 135–450)
PMV BLD AUTO: 8.6 FL (ref 5–10.5)
POTASSIUM SERPL-SCNC: 4.2 MMOL/L (ref 3.5–5.1)
PROT SERPL-MCNC: 7.2 G/DL (ref 6.4–8.2)
RBC # BLD AUTO: 5.06 M/UL (ref 4–5.2)
SODIUM SERPL-SCNC: 153 MMOL/L (ref 136–145)
T4 FREE SERPL-MCNC: 0.9 NG/DL (ref 0.9–1.8)
TRIGL SERPL-MCNC: 122 MG/DL (ref 0–150)
TSH SERPL DL<=0.005 MIU/L-ACNC: 2.94 UIU/ML (ref 0.27–4.2)
VIT B12 SERPL-MCNC: 1874 PG/ML (ref 211–911)
VLDLC SERPL CALC-MCNC: 24 MG/DL
WBC # BLD AUTO: 6.2 K/UL (ref 4–11)

## 2024-11-26 RX ORDER — SERTRALINE HYDROCHLORIDE 25 MG/1
25 TABLET, FILM COATED ORAL DAILY
COMMUNITY

## 2024-11-26 NOTE — PROGRESS NOTES
Financial Resource Strain (CARDIA)     Difficulty of Paying Living Expenses: Not very hard   Food Insecurity: Food Insecurity Present (10/21/2024)    Hunger Vital Sign     Worried About Running Out of Food in the Last Year: Sometimes true     Ran Out of Food in the Last Year: Never true   Transportation Needs: Unknown (10/21/2024)    PRAPARE - Transportation     Lack of Transportation (Medical): Not on file     Lack of Transportation (Non-Medical): No   Physical Activity: Not on file   Stress: Not on file   Social Connections: Not on file   Intimate Partner Violence: Not on file   Housing Stability: Unknown (10/21/2024)    Housing Stability Vital Sign     Unable to Pay for Housing in the Last Year: Not on file     Number of Times Moved in the Last Year: Not on file     Homeless in the Last Year: No       Prior to Visit Medications    Medication Sig Taking? Authorizing Provider   sertraline (ZOLOFT) 25 MG tablet Take 1 tablet by mouth daily Yes Provider, MD Donna   PARoxetine (PAXIL) 40 MG tablet Take 1 tablet by mouth every morning Yes Reese Donaldson MD       Allergies   Allergen Reactions    Morphine      Nausea      Morphine     Penicillins      Nausea      Penicillins     Prochlorperazine      dystonic    Prochlorperazine Edisylate        OBJECTIVE:    /86   Pulse 94   Ht 1.575 m (5' 2\")   Wt 91.6 kg (202 lb)   LMP 06/19/2016   SpO2 98%   BMI 36.95 kg/m²     BP Readings from Last 2 Encounters:   11/26/24 132/86   10/21/24 (!) 142/90       Wt Readings from Last 3 Encounters:   11/26/24 91.6 kg (202 lb)   10/21/24 90.9 kg (200 lb 6.4 oz)   10/19/24 91.1 kg (200 lb 12.8 oz)       Physical Exam  Constitutional:       Appearance: She is well-developed.   HENT:      Head: Atraumatic.      Right Ear: Hearing normal.      Left Ear: Hearing normal.   Eyes:      Extraocular Movements: Extraocular movements intact.      Conjunctiva/sclera: Conjunctivae normal.   Neck:      Trachea: No tracheal deviation.

## 2024-11-26 NOTE — RESULT ENCOUNTER NOTE
Plenty of B12.  B12 actually a bit high.  Could decrease amount of supplementation.  Cholesterol significantly higher than last check.  Sodium is elevated as well.  I would recommend significant improvement in hydration with water.  Avoid excess salt.  Repeat basic metabolic panel in 1 to 2 weeks.  CBC, magnesium, and thyroid labs all normal.

## 2024-12-22 LAB
ALBUMIN: 4.5 G/DL
ALP BLD-CCNC: 107 U/L
ALT SERPL-CCNC: 26 U/L
ANION GAP SERPL CALCULATED.3IONS-SCNC: 12.4 MMOL/L
AST SERPL-CCNC: 29 U/L
BASOPHILS ABSOLUTE: NORMAL
BASOPHILS RELATIVE PERCENT: 0.9 %
BILIRUB SERPL-MCNC: 0.8 MG/DL (ref 0.1–1.4)
BUN BLDV-MCNC: 16 MG/DL
CALCIUM SERPL-MCNC: 9.5 MG/DL
CHLORIDE BLD-SCNC: 105 MMOL/L
CO2: 23 MMOL/L
CREAT SERPL-MCNC: 0.8 MG/DL
EOSINOPHILS ABSOLUTE: NORMAL
EOSINOPHILS RELATIVE PERCENT: 2.7 %
GFR, ESTIMATED: 72
GLUCOSE BLD-MCNC: 117 MG/DL
HCT VFR BLD CALC: 43.2 % (ref 36–46)
HEMOGLOBIN: 14.2 G/DL (ref 12–16)
LIPASE: 78 UNITS/L
LYMPHOCYTES ABSOLUTE: 2.6 /ΜL
LYMPHOCYTES RELATIVE PERCENT: 37.1 %
MCH RBC QN AUTO: 29.6 PG
MCHC RBC AUTO-ENTMCNC: 32.9 G/DL
MCV RBC AUTO: 90 FL
MONOCYTES ABSOLUTE: NORMAL
MONOCYTES RELATIVE PERCENT: 9.7 %
NEUTROPHILS ABSOLUTE: 3.5 /ΜL
NEUTROPHILS RELATIVE PERCENT: 49.5 %
PDW BLD-RTO: 13.4 %
PLATELET # BLD: 251 K/ΜL
PMV BLD AUTO: NORMAL FL
POTASSIUM SERPL-SCNC: 3.4 MMOL/L
RBC # BLD: 4.8 10^6/ΜL
SODIUM BLD-SCNC: 137 MMOL/L
TOTAL PROTEIN: 7.8 G/DL (ref 6.4–8.2)
WBC # BLD: 7 10^3/ML

## 2025-01-08 ENCOUNTER — OFFICE VISIT (OUTPATIENT)
Dept: FAMILY MEDICINE CLINIC | Age: 65
End: 2025-01-08
Payer: COMMERCIAL

## 2025-01-08 VITALS
SYSTOLIC BLOOD PRESSURE: 132 MMHG | HEART RATE: 114 BPM | DIASTOLIC BLOOD PRESSURE: 88 MMHG | WEIGHT: 187 LBS | OXYGEN SATURATION: 98 % | BODY MASS INDEX: 34.41 KG/M2 | HEIGHT: 62 IN

## 2025-01-08 DIAGNOSIS — F32.A DEPRESSION, UNSPECIFIED DEPRESSION TYPE: ICD-10-CM

## 2025-01-08 DIAGNOSIS — F43.10 PTSD (POST-TRAUMATIC STRESS DISORDER): Primary | ICD-10-CM

## 2025-01-08 DIAGNOSIS — F33.1 MODERATE EPISODE OF RECURRENT MAJOR DEPRESSIVE DISORDER (HCC): ICD-10-CM

## 2025-01-08 PROCEDURE — 99214 OFFICE O/P EST MOD 30 MIN: CPT | Performed by: FAMILY MEDICINE

## 2025-01-08 RX ORDER — ESTRADIOL 0.04 MG/D
1 PATCH, EXTENDED RELEASE TRANSDERMAL
Qty: 8 PATCH | Refills: 3
Start: 2025-01-09

## 2025-01-08 RX ORDER — PAROXETINE 20 MG/1
20 TABLET, FILM COATED ORAL EVERY MORNING
Qty: 30 TABLET | Refills: 3 | Status: SHIPPED | OUTPATIENT
Start: 2025-01-08

## 2025-01-08 RX ORDER — SERTRALINE HYDROCHLORIDE 100 MG/1
100 TABLET, FILM COATED ORAL DAILY
Qty: 30 TABLET | Refills: 3 | Status: SHIPPED | OUTPATIENT
Start: 2025-01-08

## 2025-01-08 RX ORDER — PROGESTERONE 200 MG/1
200 CAPSULE ORAL NIGHTLY
Qty: 30 CAPSULE | Refills: 5
Start: 2025-01-08

## 2025-01-08 ASSESSMENT — COLUMBIA-SUICIDE SEVERITY RATING SCALE - C-SSRS
6. HAVE YOU EVER DONE ANYTHING, STARTED TO DO ANYTHING, OR PREPARED TO DO ANYTHING TO END YOUR LIFE?: NO
3. HAVE YOU BEEN THINKING ABOUT HOW YOU MIGHT KILL YOURSELF?: YES
2. HAVE YOU ACTUALLY HAD ANY THOUGHTS OF KILLING YOURSELF?: YES
1. WITHIN THE PAST MONTH, HAVE YOU WISHED YOU WERE DEAD OR WISHED YOU COULD GO TO SLEEP AND NOT WAKE UP?: YES
5. HAVE YOU STARTED TO WORK OUT OR WORKED OUT THE DETAILS OF HOW TO KILL YOURSELF? DO YOU INTEND TO CARRY OUT THIS PLAN?: NO
4. HAVE YOU HAD THESE THOUGHTS AND HAD SOME INTENTION OF ACTING ON THEM?: NO

## 2025-01-08 ASSESSMENT — PATIENT HEALTH QUESTIONNAIRE - PHQ9
SUM OF ALL RESPONSES TO PHQ QUESTIONS 1-9: 26
7. TROUBLE CONCENTRATING ON THINGS, SUCH AS READING THE NEWSPAPER OR WATCHING TELEVISION: NEARLY EVERY DAY
10. IF YOU CHECKED OFF ANY PROBLEMS, HOW DIFFICULT HAVE THESE PROBLEMS MADE IT FOR YOU TO DO YOUR WORK, TAKE CARE OF THINGS AT HOME, OR GET ALONG WITH OTHER PEOPLE: EXTREMELY DIFFICULT
5. POOR APPETITE OR OVEREATING: NEARLY EVERY DAY
SUM OF ALL RESPONSES TO PHQ QUESTIONS 1-9: 26
SUM OF ALL RESPONSES TO PHQ9 QUESTIONS 1 & 2: 6
1. LITTLE INTEREST OR PLEASURE IN DOING THINGS: NEARLY EVERY DAY
6. FEELING BAD ABOUT YOURSELF - OR THAT YOU ARE A FAILURE OR HAVE LET YOURSELF OR YOUR FAMILY DOWN: NEARLY EVERY DAY
4. FEELING TIRED OR HAVING LITTLE ENERGY: NEARLY EVERY DAY
2. FEELING DOWN, DEPRESSED OR HOPELESS: NEARLY EVERY DAY
9. THOUGHTS THAT YOU WOULD BE BETTER OFF DEAD, OR OF HURTING YOURSELF: MORE THAN HALF THE DAYS
SUM OF ALL RESPONSES TO PHQ QUESTIONS 1-9: 24
3. TROUBLE FALLING OR STAYING ASLEEP: NEARLY EVERY DAY
SUM OF ALL RESPONSES TO PHQ QUESTIONS 1-9: 26
8. MOVING OR SPEAKING SO SLOWLY THAT OTHER PEOPLE COULD HAVE NOTICED. OR THE OPPOSITE, BEING SO FIGETY OR RESTLESS THAT YOU HAVE BEEN MOVING AROUND A LOT MORE THAN USUAL: NEARLY EVERY DAY

## 2025-01-08 NOTE — PROGRESS NOTES
into this program in a timely fashion, I did send increased dose of Zoloft and decrease dose of Paxil to her pharmacy.  I did explain to the patient that her current symptoms and lack of improvement with multiple different medications would necessitate psychiatry treatment.  - [facility] PHP/IOP Group Therapy Program    2. Depression, unspecified depression type  See above  - [facility]/IOP Group Therapy Program  - PARoxetine (PAXIL) 20 MG tablet; Take 1 tablet by mouth every morning  Dispense: 30 tablet; Refill: 3  - sertraline (ZOLOFT) 100 MG tablet; Take 1 tablet by mouth daily  Dispense: 30 tablet; Refill: 3    3. Moderate episode of recurrent major depressive disorder (HCC)  See above  - [facility] PHP/IOP Group Therapy Program      This document was prepared by a combination of typing and transcription through a voice recognition software.

## 2025-01-09 ENCOUNTER — HOSPITAL ENCOUNTER (EMERGENCY)
Age: 65
Discharge: HOME | End: 2025-01-09
Attending: STUDENT IN AN ORGANIZED HEALTH CARE EDUCATION/TRAINING PROGRAM
Payer: COMMERCIAL

## 2025-01-09 VITALS
HEIGHT: 62 IN | WEIGHT: 197 LBS | RESPIRATION RATE: 18 BRPM | TEMPERATURE: 99.1 F | HEART RATE: 87 BPM | OXYGEN SATURATION: 100 % | BODY MASS INDEX: 36.25 KG/M2 | SYSTOLIC BLOOD PRESSURE: 135 MMHG | DIASTOLIC BLOOD PRESSURE: 70 MMHG

## 2025-01-09 DIAGNOSIS — E87.6 HYPOKALEMIA: ICD-10-CM

## 2025-01-09 DIAGNOSIS — R00.0 TACHYCARDIA: ICD-10-CM

## 2025-01-09 DIAGNOSIS — E86.0 DEHYDRATION: ICD-10-CM

## 2025-01-09 DIAGNOSIS — F41.9 ANXIETY: Primary | ICD-10-CM

## 2025-01-09 DIAGNOSIS — E83.42 HYPOMAGNESEMIA: ICD-10-CM

## 2025-01-09 LAB
ALBUMIN SERPL-MCNC: 3.8 G/DL (ref 3.4–5)
ALBUMIN/GLOB SERPL: 1.4 {RATIO} (ref 1.1–2.2)
ALP SERPL-CCNC: 100 U/L (ref 40–129)
ALT SERPL-CCNC: 11 U/L (ref 10–40)
AMPHETAMINES UR QL SCN>1000 NG/ML: ABNORMAL
ANION GAP SERPL CALCULATED.3IONS-SCNC: 18 MMOL/L (ref 3–16)
ANION GAP SERPL CALCULATED.3IONS-SCNC: 8 MMOL/L (ref 3–16)
APAP SERPL-MCNC: <5 UG/ML (ref 10–30)
AST SERPL-CCNC: 19 U/L (ref 15–37)
BACTERIA URNS QL MICRO: ABNORMAL /HPF
BARBITURATES UR QL SCN>200 NG/ML: ABNORMAL
BASOPHILS # BLD: 0.1 K/UL (ref 0–0.2)
BASOPHILS NFR BLD: 1 %
BENZODIAZ UR QL SCN>200 NG/ML: POSITIVE
BILIRUB SERPL-MCNC: 0.3 MG/DL (ref 0–1)
BILIRUB UR QL STRIP.AUTO: ABNORMAL
BUN SERPL-MCNC: 12 MG/DL (ref 7–20)
BUN SERPL-MCNC: 14 MG/DL (ref 7–20)
CALCIUM SERPL-MCNC: 7.7 MG/DL (ref 8.3–10.6)
CALCIUM SERPL-MCNC: 8.7 MG/DL (ref 8.3–10.6)
CANNABINOIDS UR QL SCN>50 NG/ML: ABNORMAL
CHLORIDE SERPL-SCNC: 101 MMOL/L (ref 99–110)
CHLORIDE SERPL-SCNC: 105 MMOL/L (ref 99–110)
CLARITY UR: ABNORMAL
CO2 SERPL-SCNC: 21 MMOL/L (ref 21–32)
CO2 SERPL-SCNC: 28 MMOL/L (ref 21–32)
COCAINE UR QL SCN: ABNORMAL
COLOR UR: YELLOW
CREAT SERPL-MCNC: 0.6 MG/DL (ref 0.6–1.2)
CREAT SERPL-MCNC: 0.6 MG/DL (ref 0.6–1.2)
DEPRECATED RDW RBC AUTO: 14 % (ref 12.4–15.4)
DRUG SCREEN COMMENT UR-IMP: ABNORMAL
EOSINOPHIL # BLD: 0.1 K/UL (ref 0–0.6)
EOSINOPHIL NFR BLD: 0.8 %
EPI CELLS #/AREA URNS HPF: ABNORMAL /HPF (ref 0–5)
ETHANOLAMINE SERPL-MCNC: NORMAL MG/DL (ref 0–0.08)
FENTANYL SCREEN, URINE: ABNORMAL
GFR SERPLBLD CREATININE-BSD FMLA CKD-EPI: >90 ML/MIN/{1.73_M2}
GFR SERPLBLD CREATININE-BSD FMLA CKD-EPI: >90 ML/MIN/{1.73_M2}
GLUCOSE SERPL-MCNC: 154 MG/DL (ref 70–99)
GLUCOSE SERPL-MCNC: 94 MG/DL (ref 70–99)
GLUCOSE UR STRIP.AUTO-MCNC: NEGATIVE MG/DL
HCT VFR BLD AUTO: 44.2 % (ref 36–48)
HGB BLD-MCNC: 14.6 G/DL (ref 12–16)
HGB UR QL STRIP.AUTO: ABNORMAL
KETONES UR STRIP.AUTO-MCNC: >=80 MG/DL
LEUKOCYTE ESTERASE UR QL STRIP.AUTO: ABNORMAL
LYMPHOCYTES # BLD: 1.3 K/UL (ref 1–5.1)
LYMPHOCYTES NFR BLD: 18.2 %
MAGNESIUM SERPL-MCNC: 1.66 MG/DL (ref 1.8–2.4)
MAGNESIUM SERPL-MCNC: 1.7 MG/DL (ref 1.8–2.4)
MCH RBC QN AUTO: 29.3 PG (ref 26–34)
MCHC RBC AUTO-ENTMCNC: 33.1 G/DL (ref 31–36)
MCV RBC AUTO: 88.5 FL (ref 80–100)
METHADONE UR QL SCN>300 NG/ML: ABNORMAL
MONOCYTES # BLD: 0.8 K/UL (ref 0–1.3)
MONOCYTES NFR BLD: 10.4 %
MUCOUS THREADS #/AREA URNS LPF: ABNORMAL /LPF
NEUTROPHILS # BLD: 5.1 K/UL (ref 1.7–7.7)
NEUTROPHILS NFR BLD: 69.6 %
NITRITE UR QL STRIP.AUTO: NEGATIVE
OPIATES UR QL SCN>300 NG/ML: ABNORMAL
OXYCODONE UR QL SCN: ABNORMAL
PCP UR QL SCN>25 NG/ML: ABNORMAL
PH UR STRIP.AUTO: 6 [PH] (ref 5–8)
PH UR STRIP: 6 [PH]
PLATELET # BLD AUTO: 277 K/UL (ref 135–450)
PMV BLD AUTO: 9.6 FL (ref 5–10.5)
POTASSIUM SERPL-SCNC: 3 MMOL/L (ref 3.5–5.1)
POTASSIUM SERPL-SCNC: 3.3 MMOL/L (ref 3.5–5.1)
PROT SERPL-MCNC: 6.6 G/DL (ref 6.4–8.2)
PROT UR STRIP.AUTO-MCNC: 30 MG/DL
RBC # BLD AUTO: 4.99 M/UL (ref 4–5.2)
RBC #/AREA URNS HPF: ABNORMAL /HPF (ref 0–4)
SALICYLATES SERPL-MCNC: <0.5 MG/DL (ref 15–30)
SODIUM SERPL-SCNC: 140 MMOL/L (ref 136–145)
SODIUM SERPL-SCNC: 141 MMOL/L (ref 136–145)
SP GR UR STRIP.AUTO: 1.02 (ref 1–1.03)
UA COMPLETE W REFLEX CULTURE PNL UR: YES
UA DIPSTICK W REFLEX MICRO PNL UR: YES
URN SPEC COLLECT METH UR: ABNORMAL
UROBILINOGEN UR STRIP-ACNC: 0.2 E.U./DL
WBC # BLD AUTO: 7.3 K/UL (ref 4–11)
WBC #/AREA URNS HPF: ABNORMAL /HPF (ref 0–5)

## 2025-01-09 PROCEDURE — 82077 ASSAY SPEC XCP UR&BREATH IA: CPT

## 2025-01-09 PROCEDURE — 2580000003 HC RX 258: Performed by: STUDENT IN AN ORGANIZED HEALTH CARE EDUCATION/TRAINING PROGRAM

## 2025-01-09 PROCEDURE — 99284 EMERGENCY DEPT VISIT MOD MDM: CPT

## 2025-01-09 PROCEDURE — 80179 DRUG ASSAY SALICYLATE: CPT

## 2025-01-09 PROCEDURE — 80143 DRUG ASSAY ACETAMINOPHEN: CPT

## 2025-01-09 PROCEDURE — 80307 DRUG TEST PRSMV CHEM ANLYZR: CPT

## 2025-01-09 PROCEDURE — 96360 HYDRATION IV INFUSION INIT: CPT

## 2025-01-09 PROCEDURE — 96361 HYDRATE IV INFUSION ADD-ON: CPT

## 2025-01-09 PROCEDURE — 87086 URINE CULTURE/COLONY COUNT: CPT

## 2025-01-09 PROCEDURE — 36415 COLL VENOUS BLD VENIPUNCTURE: CPT

## 2025-01-09 PROCEDURE — 85025 COMPLETE CBC W/AUTO DIFF WBC: CPT

## 2025-01-09 PROCEDURE — 80053 COMPREHEN METABOLIC PANEL: CPT

## 2025-01-09 PROCEDURE — 6370000000 HC RX 637 (ALT 250 FOR IP): Performed by: STUDENT IN AN ORGANIZED HEALTH CARE EDUCATION/TRAINING PROGRAM

## 2025-01-09 PROCEDURE — 81001 URINALYSIS AUTO W/SCOPE: CPT

## 2025-01-09 PROCEDURE — 83735 ASSAY OF MAGNESIUM: CPT

## 2025-01-09 RX ORDER — LANOLIN ALCOHOL/MO/W.PET/CERES
400 CREAM (GRAM) TOPICAL DAILY
Status: DISCONTINUED | OUTPATIENT
Start: 2025-01-09 | End: 2025-01-10 | Stop reason: HOSPADM

## 2025-01-09 RX ORDER — ARIPIPRAZOLE 5 MG/1
2.5 TABLET ORAL NIGHTLY
Qty: 15 TABLET | Refills: 0 | Status: SHIPPED | OUTPATIENT
Start: 2025-01-09 | End: 2025-02-08

## 2025-01-09 RX ORDER — DEXTROSE MONOHYDRATE AND SODIUM CHLORIDE 5; .9 G/100ML; G/100ML
1000 INJECTION, SOLUTION INTRAVENOUS ONCE
Status: COMPLETED | OUTPATIENT
Start: 2025-01-09 | End: 2025-01-09

## 2025-01-09 RX ORDER — POTASSIUM CHLORIDE 1500 MG/1
40 TABLET, EXTENDED RELEASE ORAL ONCE
Status: COMPLETED | OUTPATIENT
Start: 2025-01-09 | End: 2025-01-09

## 2025-01-09 RX ORDER — LORAZEPAM 1 MG/1
1 TABLET ORAL ONCE
Status: COMPLETED | OUTPATIENT
Start: 2025-01-09 | End: 2025-01-09

## 2025-01-09 RX ORDER — ARIPIPRAZOLE 2 MG/1
2 TABLET ORAL ONCE
Status: COMPLETED | OUTPATIENT
Start: 2025-01-09 | End: 2025-01-09

## 2025-01-09 RX ADMIN — Medication 400 MG: at 18:43

## 2025-01-09 RX ADMIN — DEXTROSE AND SODIUM CHLORIDE 1000 ML: 5; .9 INJECTION, SOLUTION INTRAVENOUS at 19:00

## 2025-01-09 RX ADMIN — LORAZEPAM 1 MG: 1 TABLET ORAL at 16:55

## 2025-01-09 RX ADMIN — POTASSIUM CHLORIDE 40 MEQ: 1500 TABLET, EXTENDED RELEASE ORAL at 21:37

## 2025-01-09 RX ADMIN — ARIPIPRAZOLE 2 MG: 2 TABLET ORAL at 21:37

## 2025-01-09 RX ADMIN — POTASSIUM BICARBONATE 40 MEQ: 782 TABLET, EFFERVESCENT ORAL at 18:44

## 2025-01-09 ASSESSMENT — PAIN - FUNCTIONAL ASSESSMENT: PAIN_FUNCTIONAL_ASSESSMENT: NONE - DENIES PAIN

## 2025-01-10 LAB — BACTERIA UR CULT: NORMAL

## 2025-01-10 NOTE — VIRTUAL HEALTH
Disp: 30 capsule, Rfl: 5    PARoxetine (PAXIL) 20 MG tablet, Take 1 tablet by mouth every morning, Disp: 30 tablet, Rfl: 3    sertraline (ZOLOFT) 100 MG tablet, Take 1 tablet by mouth daily, Disp: 30 tablet, Rfl: 3  Not in a hospital admission.  Prior to Admission medications    Medication Sig Start Date End Date Taking? Authorizing Provider   estradiol (VIVELLE-DOT) 0.0375 MG/24HR Place 1 patch onto the skin Twice a Week 1/9/25   Reese Donaldson MD   progesterone (PROMETRIUM) 200 MG CAPS capsule Take 1 capsule by mouth nightly 1/8/25   Reese Donaldson MD   PARoxetine (PAXIL) 20 MG tablet Take 1 tablet by mouth every morning 1/8/25   Reese Donaldson MD   sertraline (ZOLOFT) 100 MG tablet Take 1 tablet by mouth daily 1/8/25   Reese Donaldson MD        Labs:  UDS: negative (Pt prescribed a benzodiazapine)  ETOH: negative  HCG: n/a      Mental Status Exam:  Level of consciousness:  within normal limits   Appearance:  well-appearing, hospital attire, and lying in bed.  Does appear stated age. No acute distress.  Behavior/Motor:  no abnormalities noted  Attitude toward examiner:  cooperative  SI/HI:Denies SI/HI  Speech:  normal rate and normal volume , Tone: normal tone  Mood: anxious and depressed  Affect: mood congruent  Thought Processes:  coherent.   Thought Content: No delusions or other perceptual abnormalities  Hallucinations:  Hallucinations: Denies AVOT-H  Cognition:  oriented to person, place, and time   Concentration: intact  Memory: intact, though not formally tested.  Insight: fair   Judgement: good   Fund of Knowledge: good    Overall Level Suicide Risk: TelePsych CSSRS Risk Level: Low Risk  CSSR-S Screening: completed screening    Brief ClinicalSummary:   Patient is a 64 y.o.  female who presents for a psych eval.     Patient states the outpatient psychiatrist she was seeing stopped taking her insurance so her PCP is managing her medications now.     She feels she has tried several things to 
Collection Time: 01/09/25  2:50 PM   Result Value Ref Range    Mucus, UA Rare (A) None Seen /LPF    WBC, UA 10-20 (A) 0 - 5 /HPF    RBC, UA 3-4 0 - 4 /HPF    Epithelial Cells, UA 6-10 (A) 0 - 5 /HPF    Bacteria, UA 3+ (A) None Seen /HPF   Magnesium    Collection Time: 01/09/25  2:50 PM   Result Value Ref Range    Magnesium 1.66 (L) 1.80 - 2.40 mg/dL   BMP w/ Reflex to MG    Collection Time: 01/09/25  8:30 PM   Result Value Ref Range    Sodium 141 136 - 145 mmol/L    Potassium reflex Magnesium 3.3 (L) 3.5 - 5.1 mmol/L    Chloride 105 99 - 110 mmol/L    CO2 28 21 - 32 mmol/L    Anion Gap 8 3 - 16    Glucose 154 (H) 70 - 99 mg/dL    BUN 12 7 - 20 mg/dL    Creatinine 0.6 0.6 - 1.2 mg/dL    Est, Glom Filt Rate >90 >60    Calcium 7.7 (L) 8.3 - 10.6 mg/dL   Magnesium    Collection Time: 01/09/25  8:30 PM   Result Value Ref Range    Magnesium 1.70 (L) 1.80 - 2.40 mg/dL       Imaging:   No orders to display     Radiology:  FL Surgical Documentation    Result Date: 12/23/2024  Fluoroscopic assistance was provided and/or was available during the above procedure.? A Radiologist was not in attendance.? ? Fluoro time =??0 minutes and 27 seconds. ? IMPRESSION:  AS ABOVE    XR ABDOMEN (KUB) (SINGLE AP VIEW)    Result Date: 12/22/2024  Examination: [id number] VIEW Exam time: 12/22/2024 21:11 EST Clinical history: 7mm left ureteral stone Comparison: 25 September 2023 Technique: Single supine view of the abdomen were obtained.[id number] VIEW Findings: Bowel gas pattern appears normal. No obvious free air on this supine film. No abnormal intra-abdominal calcifications. Bony structures are grossly intact. No organomegaly. Postoperative change from cholecystectomy. Multiple splenic granulomas IMPRESSION: IMPRESSION: No acute abnormality. Electronically signed by Nayan Reardon      Review of Systems:  Reports no current cardiovascular, respiratory, gastrointestinal, genitourinary, integumentary, neurological,

## 2025-01-10 NOTE — ED PROVIDER NOTES
0134 Hemoglobin Quant: 14.6 [NG]   0135 Hematocrit: 44.2 [NG]   0135 Platelet Count: 277  Blood counts benign [NG]   0135 The patient was ultimately medically cleared and for the benefit of a more thorough psychiatric evaluation I did consult telepsychiatry who interviewed the patient and ultimately found the patient in good condition for outpatient management.  No indication for hospitalization and her medical course was also reassuring no indication for hospitalization from medical perspective.  Given this she was discharged in stable condition with new recommendations for therapies for her anxiety as well as recommendations to assist her primary and managing her condition until the patient can reestablish with psychiatry.  Patient agreeable to this plan this concludes her ED course again discharged in stable condition much improved [NG]      ED Course User Index  [NG] Juan Travis MD       Additional Reassessment    Is this patient to be included in the SEP-1 core measure? No Exclusion criteria - the patient is NOT to be included for SEP-1 Core Measure due to: Infection is not suspected    Medical Decision Making  Presentation for anxiety state reassuring course symptomatically improved.  Presenting condition also complicated by likely degree of dehydration contributing to her ptosis as well as low potassium and magnesium repleted    Problems Addressed:  Anxiety: acute illness or injury  Dehydration: acute illness or injury  Hypokalemia: acute illness or injury  Hypomagnesemia: acute illness or injury  Tachycardia: acute illness or injury    Amount and/or Complexity of Data Reviewed  Labs: ordered. Decision-making details documented in ED Course.    Risk  Prescription drug management.  Decision regarding hospitalization.          The patient was given the followingmedications:  Orders Placed This Encounter   Medications    LORazepam (ATIVAN) tablet 1 mg    dextrose 5 % and 0.9 % sodium chloride infusion

## 2025-01-10 NOTE — ED NOTES
RN provided patient with discharge instructions, any prescriptions and possible side effects already discussed.   --Instructions, dosing, and follow-up appointments reviewed with patient/family. No further questions or needs at this time.   --Vital signs stable and pt discharged without difficulty.   Donna Galarza

## 2025-01-10 NOTE — DISCHARGE INSTRUCTIONS
You were evaluated in the emergency department for anxiety. Assessments and testing completed during your visit were reassuring and at this time there is no indication for further testing, treatment or admission to the hospital. Given this it is appropriate to discharge you from the emergency department. At the time of discharge we discussed the following:    Please start the Abilify and take the additional recommendations to your primary doctor for further guidance as copied below from psychiatry    Please note that sometimes it is difficult to diagnose a medical condition early in the disease process before the disease is fully manifest. Because of this, should you develop any new or worsening symptoms, you may return at any time to the emergency department for another evaluation. If available you are also recommended to review this visit with your primary care physician or other medical provider in the next 7 days. Thank you for allowing us to care for you today.      Psychiatric Recommendations:    Start Abilify 2.5mg at bedtime; if ineffective in 2 weeks consider increase to 5mg at bedtime, not recommending dose over 10mg.    Continue with PCP recommendations to titrate decrease Paxil 20mg daily and increase Zoloft 100mg daily; further titration schedule decrease Paxil 20mg daily x7 days, Paxil 10mg daily x7 days; Paxil 10mg every other day x7 days; consider increase of zoloft 4 weeks to 150mg if needed    Consider initiating Propanolol 10mg TID PRN for break through anxiety; may need dosage increase if some improvement shown at low dose but not sufficiently effective; use ativan as secondary for severe anxiety.  Goal to DC ativan and manage anxiety with noncontrol substance; monitor HR and BP, as well as subjective assessment of patient reported symptoms    Follow up weekly until stabilized or transferred care to outpatient psychiatrist; if local option are not available in timely manner, call back of

## 2025-01-17 ENCOUNTER — HOSPITAL ENCOUNTER (OUTPATIENT)
Dept: PSYCHIATRY | Age: 65
Setting detail: THERAPIES SERIES
Discharge: HOME | End: 2025-01-17

## 2025-01-17 ASSESSMENT — ANXIETY QUESTIONNAIRES
5. BEING SO RESTLESS THAT IT IS HARD TO SIT STILL: SEVERAL DAYS
6. BECOMING EASILY ANNOYED OR IRRITABLE: NOT AT ALL
3. WORRYING TOO MUCH ABOUT DIFFERENT THINGS: NEARLY EVERY DAY
2. NOT BEING ABLE TO STOP OR CONTROL WORRYING: NEARLY EVERY DAY
7. FEELING AFRAID AS IF SOMETHING AWFUL MIGHT HAPPEN: SEVERAL DAYS
IF YOU CHECKED OFF ANY PROBLEMS ON THIS QUESTIONNAIRE, HOW DIFFICULT HAVE THESE PROBLEMS MADE IT FOR YOU TO DO YOUR WORK, TAKE CARE OF THINGS AT HOME, OR GET ALONG WITH OTHER PEOPLE: EXTREMELY DIFFICULT
1. FEELING NERVOUS, ANXIOUS, OR ON EDGE: SEVERAL DAYS
4. TROUBLE RELAXING: NEARLY EVERY DAY
GAD7 TOTAL SCORE: 12

## 2025-01-17 ASSESSMENT — PATIENT HEALTH QUESTIONNAIRE - PHQ9
4. FEELING TIRED OR HAVING LITTLE ENERGY: MORE THAN HALF THE DAYS
3. TROUBLE FALLING OR STAYING ASLEEP: NEARLY EVERY DAY
SUM OF ALL RESPONSES TO PHQ QUESTIONS 1-9: 22
10. IF YOU CHECKED OFF ANY PROBLEMS, HOW DIFFICULT HAVE THESE PROBLEMS MADE IT FOR YOU TO DO YOUR WORK, TAKE CARE OF THINGS AT HOME, OR GET ALONG WITH OTHER PEOPLE: EXTREMELY DIFFICULT
SUM OF ALL RESPONSES TO PHQ QUESTIONS 1-9: 22
SUM OF ALL RESPONSES TO PHQ9 QUESTIONS 1 & 2: 5
8. MOVING OR SPEAKING SO SLOWLY THAT OTHER PEOPLE COULD HAVE NOTICED. OR THE OPPOSITE, BEING SO FIGETY OR RESTLESS THAT YOU HAVE BEEN MOVING AROUND A LOT MORE THAN USUAL: NEARLY EVERY DAY
6. FEELING BAD ABOUT YOURSELF - OR THAT YOU ARE A FAILURE OR HAVE LET YOURSELF OR YOUR FAMILY DOWN: NEARLY EVERY DAY
5. POOR APPETITE OR OVEREATING: SEVERAL DAYS
1. LITTLE INTEREST OR PLEASURE IN DOING THINGS: NEARLY EVERY DAY
2. FEELING DOWN, DEPRESSED OR HOPELESS: MORE THAN HALF THE DAYS
9. THOUGHTS THAT YOU WOULD BE BETTER OFF DEAD, OR OF HURTING YOURSELF: MORE THAN HALF THE DAYS
SUM OF ALL RESPONSES TO PHQ QUESTIONS 1-9: 22
7. TROUBLE CONCENTRATING ON THINGS, SUCH AS READING THE NEWSPAPER OR WATCHING TELEVISION: NEARLY EVERY DAY
SUM OF ALL RESPONSES TO PHQ QUESTIONS 1-9: 20

## 2025-01-17 ASSESSMENT — SLEEP AND FATIGUE QUESTIONNAIRES
DO YOU USE A SLEEP AID: COMMENT
SLEEP PATTERN: DIFFICULTY FALLING ASLEEP;DIFFICULTY ARISING;DISTURBED/INTERRUPTED SLEEP;INSOMNIA;NIGHTMARES/TERRORS
AVERAGE NUMBER OF SLEEP HOURS: 6
DO YOU HAVE DIFFICULTY SLEEPING: YES

## 2025-01-17 NOTE — BH NOTE
notified pt that her OOP cost for each IOP visit will be $40 so for 12 visit it would be $480. Marciar informed her that payment is not taken up front, she would receive a bill from the hospital, and financial aid could assist in setting up a payment plan.    also informed pt she could call her insurance company to confirm OOP cost.
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
## 2025-01-20 ENCOUNTER — HOSPITAL ENCOUNTER (OUTPATIENT)
Dept: PSYCHIATRY | Age: 65
Setting detail: THERAPIES SERIES
Discharge: HOME | End: 2025-01-20
Payer: COMMERCIAL

## 2025-01-20 DIAGNOSIS — F32.A DEPRESSION, UNSPECIFIED DEPRESSION TYPE: Primary | ICD-10-CM

## 2025-01-20 PROCEDURE — 90853 GROUP PSYCHOTHERAPY: CPT

## 2025-01-20 NOTE — BH NOTE
Pt states (and presents) she is very anxious and is going home. Writer asked if she had a PRN medication she could take, but pt had left it at home. Writer talked with pt about staying for the next group, but she wanted to go. Writer instructed pt to call if needed on her way home.

## 2025-01-20 NOTE — BH NOTE
Patient was not present for the 11:15 AM class. Per nursing, patient left after psychotherapy due to feeling anxious.

## 2025-01-20 NOTE — GROUP NOTE
Group Therapy Note    Date: 1/20/2025    Group Start Time:  8:30 AM  Group End Time:  9:45 AM  Group Topic: Psychotherapy    [facility] – [place], [place] OP Silvia Dickerson LPC    Group participants engaged in agenda-setting psychotherapy session. Participants identified their personal goals for group, focusing on the present moment, current stressors, and relationship dynamics. Participants worked to offer/accept additional perspectives and feedback from each other to help peers process their thoughts/feelings while also progressing toward identified goal.     Group Therapy Note    Attendees: 8       Patient's Goal:  \"Keep anxiety away\"    Notes:  Patient engaged throughout the duration of the group. Patient was able to understand the perspectives of other peers' experiences and offered insight/connection. Patient shared recent stressors, including coming to the program and just generally feeling anxious. Patient accepted feedback and was able to create a goal to enhance daily functioning/relationships.     Status After Intervention:  Improved    Participation Level: Active Listener and Interactive    Participation Quality: Appropriate, Attentive, Sharing, and Supportive      Speech:  normal      Thought Process/Content: Linear      Affective Functioning: Congruent      Mood: anxious      Level of consciousness:  Alert      Response to Learning: Able to verbalize current knowledge/experience      Endings: None Reported    Modes of Intervention: Support, Exploration, Clarifying, and Problem-solving      Discipline Responsible: /Counselor      Signature:  Silvia Ramirez LPC

## 2025-01-22 ENCOUNTER — APPOINTMENT (OUTPATIENT)
Dept: PSYCHIATRY | Age: 65
End: 2025-01-22
Payer: COMMERCIAL

## 2025-01-22 ENCOUNTER — FOLLOWUP TELEPHONE ENCOUNTER (OUTPATIENT)
Dept: PSYCHIATRY | Age: 65
End: 2025-01-22

## 2025-01-22 NOTE — TELEPHONE ENCOUNTER
Pt left a VM stating she did not wish to continue with IOP.    Writer mailed resources for individual therapy. Tx team notified.

## 2025-01-23 ENCOUNTER — OFFICE VISIT (OUTPATIENT)
Dept: FAMILY MEDICINE CLINIC | Age: 65
End: 2025-01-23

## 2025-01-23 VITALS
WEIGHT: 190.4 LBS | HEART RATE: 84 BPM | SYSTOLIC BLOOD PRESSURE: 118 MMHG | HEIGHT: 62 IN | OXYGEN SATURATION: 95 % | BODY MASS INDEX: 35.04 KG/M2 | DIASTOLIC BLOOD PRESSURE: 84 MMHG

## 2025-01-23 DIAGNOSIS — R30.0 BURNING WITH URINATION: Primary | ICD-10-CM

## 2025-01-23 DIAGNOSIS — R31.9 URINARY TRACT INFECTION WITH HEMATURIA, SITE UNSPECIFIED: ICD-10-CM

## 2025-01-23 DIAGNOSIS — N39.0 URINARY TRACT INFECTION WITH HEMATURIA, SITE UNSPECIFIED: ICD-10-CM

## 2025-01-23 DIAGNOSIS — F43.10 PTSD (POST-TRAUMATIC STRESS DISORDER): ICD-10-CM

## 2025-01-23 LAB
BILIRUBIN, POC: NEGATIVE
BLOOD URINE, POC: NORMAL
CLARITY, POC: CLEAR
COLOR, POC: YELLOW
GLUCOSE URINE, POC: NORMAL MG/DL
KETONES, POC: NEGATIVE MG/DL
LEUKOCYTE EST, POC: NORMAL
NITRITE, POC: POSITIVE
PH, POC: 8.5
PROTEIN, POC: NORMAL MG/DL
SPECIFIC GRAVITY, POC: 1.01
UROBILINOGEN, POC: NORMAL MG/DL

## 2025-01-23 RX ORDER — CIPROFLOXACIN 250 MG/1
250 TABLET, FILM COATED ORAL 2 TIMES DAILY
Qty: 14 TABLET | Refills: 0 | Status: SHIPPED | OUTPATIENT
Start: 2025-01-23 | End: 2025-01-30

## 2025-01-23 SDOH — ECONOMIC STABILITY: FOOD INSECURITY: WITHIN THE PAST 12 MONTHS, YOU WORRIED THAT YOUR FOOD WOULD RUN OUT BEFORE YOU GOT MONEY TO BUY MORE.: NEVER TRUE

## 2025-01-23 SDOH — ECONOMIC STABILITY: FOOD INSECURITY: WITHIN THE PAST 12 MONTHS, THE FOOD YOU BOUGHT JUST DIDN'T LAST AND YOU DIDN'T HAVE MONEY TO GET MORE.: NEVER TRUE

## 2025-01-23 NOTE — PROGRESS NOTES
Chief Complaint   Patient presents with    burning with urination       HPI:  Christina Alfaro is a 64 y.o. (: 1960) here today   for burning with urination.  HPI  Sxs over past couple of weeks, then sig worse over past week or so.  Home urine test positive.  Took some leftover amoxicillin.  Has taken some pyridium as well.  Subjective fever last week.      Has had some flank pain as well.      Had seen endo.  Hormone labs.  Now on HRT.  Endo managing.  Testosterone has been elevated.  Has pellet.  Waiting to dissolve.     Was given abilify in ER.  Seems to be rodger.  Has been weaning paxil.  Rodger zoloft.  Tried outpt tx at [place]. Did not tolerate.      Patient's medications, allergies, past medical, surgical, social and family histories were reviewed and updated asappropriate.    ROS:  Review of Systems   Constitutional:  Negative for fever.   Genitourinary:  Positive for dysuria, frequency and urgency. Negative for hematuria.           Prior to Visit Medications    Medication Sig Taking? Authorizing Provider   ARIPiprazole (ABILIFY) 5 MG tablet Take 0.5 tablets by mouth at bedtime Yes Juan Travis MD   estradiol (VIVELLE-DOT) 0.0375 MG/24HR Place 1 patch onto the skin Twice a Week Yes Reese Donaldson MD   progesterone (PROMETRIUM) 200 MG CAPS capsule Take 1 capsule by mouth nightly Yes Reese Donaldson MD   PARoxetine (PAXIL) 20 MG tablet Take 1 tablet by mouth every morning  Patient taking differently: Take 1 tablet by mouth every morning Indications: Patient states she is being weaned off of this med. Taking 10 mg daily. Yes Reese Donaldson MD   sertraline (ZOLOFT) 100 MG tablet Take 1 tablet by mouth daily Yes Reese Donaldson MD       Allergies   Allergen Reactions    Morphine      Nausea      Morphine     Penicillins      Nausea    Can take amoxicillin    Penicillins     Prochlorperazine      dystonic    Prochlorperazine Edisylate        OBJECTIVE:    /84   Pulse 84   Ht 1.575 m (5' 2\")   Wt

## 2025-01-24 ENCOUNTER — APPOINTMENT (OUTPATIENT)
Dept: PSYCHIATRY | Age: 65
End: 2025-01-24
Payer: COMMERCIAL

## 2025-01-26 LAB
BACTERIA UR CULT: ABNORMAL
BACTERIA UR CULT: ABNORMAL
ORGANISM: ABNORMAL

## 2025-01-27 ENCOUNTER — APPOINTMENT (OUTPATIENT)
Dept: PSYCHIATRY | Age: 65
End: 2025-01-27
Payer: COMMERCIAL

## 2025-01-28 LAB
BACTERIA UR CULT: ABNORMAL
BACTERIA UR CULT: ABNORMAL
ORGANISM: ABNORMAL

## 2025-01-29 ENCOUNTER — APPOINTMENT (OUTPATIENT)
Dept: PSYCHIATRY | Age: 65
End: 2025-01-29
Payer: COMMERCIAL

## 2025-01-31 ENCOUNTER — APPOINTMENT (OUTPATIENT)
Dept: PSYCHIATRY | Age: 65
End: 2025-01-31
Payer: COMMERCIAL

## 2025-03-26 ENCOUNTER — OFFICE VISIT (OUTPATIENT)
Dept: FAMILY MEDICINE CLINIC | Age: 65
End: 2025-03-26
Payer: COMMERCIAL

## 2025-03-26 VITALS
HEIGHT: 62 IN | OXYGEN SATURATION: 97 % | WEIGHT: 183 LBS | SYSTOLIC BLOOD PRESSURE: 122 MMHG | DIASTOLIC BLOOD PRESSURE: 76 MMHG | BODY MASS INDEX: 33.68 KG/M2 | HEART RATE: 76 BPM

## 2025-03-26 DIAGNOSIS — E78.5 HYPERLIPIDEMIA, UNSPECIFIED HYPERLIPIDEMIA TYPE: ICD-10-CM

## 2025-03-26 DIAGNOSIS — F41.9 ANXIETY: Primary | ICD-10-CM

## 2025-03-26 DIAGNOSIS — E53.8 B12 DEFICIENCY: ICD-10-CM

## 2025-03-26 DIAGNOSIS — E83.42 HYPOMAGNESEMIA: ICD-10-CM

## 2025-03-26 DIAGNOSIS — F32.A DEPRESSION, UNSPECIFIED DEPRESSION TYPE: ICD-10-CM

## 2025-03-26 LAB
25(OH)D3 SERPL-MCNC: 37.2 NG/ML
ALBUMIN SERPL-MCNC: 4.5 G/DL (ref 3.4–5)
ALBUMIN/GLOB SERPL: 1.6 {RATIO} (ref 1.1–2.2)
ALP SERPL-CCNC: 91 U/L (ref 40–129)
ALT SERPL-CCNC: 13 U/L (ref 10–40)
ANION GAP SERPL CALCULATED.3IONS-SCNC: 15 MMOL/L (ref 3–16)
AST SERPL-CCNC: 19 U/L (ref 15–37)
BILIRUB SERPL-MCNC: 0.3 MG/DL (ref 0–1)
BUN SERPL-MCNC: 14 MG/DL (ref 7–20)
CALCIUM SERPL-MCNC: 9.6 MG/DL (ref 8.3–10.6)
CHLORIDE SERPL-SCNC: 101 MMOL/L (ref 99–110)
CHOLEST SERPL-MCNC: 201 MG/DL (ref 0–199)
CO2 SERPL-SCNC: 25 MMOL/L (ref 21–32)
CREAT SERPL-MCNC: 0.6 MG/DL (ref 0.6–1.2)
FOLATE SERPL-MCNC: 17.9 NG/ML (ref 4.78–24.2)
GFR SERPLBLD CREATININE-BSD FMLA CKD-EPI: >90 ML/MIN/{1.73_M2}
GLUCOSE SERPL-MCNC: 84 MG/DL (ref 70–99)
HDLC SERPL-MCNC: 49 MG/DL (ref 40–60)
LDLC SERPL CALC-MCNC: 133 MG/DL
MAGNESIUM SERPL-MCNC: 2.06 MG/DL (ref 1.8–2.4)
POTASSIUM SERPL-SCNC: 4.8 MMOL/L (ref 3.5–5.1)
PROT SERPL-MCNC: 7.3 G/DL (ref 6.4–8.2)
SODIUM SERPL-SCNC: 141 MMOL/L (ref 136–145)
TRIGL SERPL-MCNC: 94 MG/DL (ref 0–150)
VIT B12 SERPL-MCNC: 690 PG/ML (ref 211–911)
VLDLC SERPL CALC-MCNC: 19 MG/DL

## 2025-03-26 PROCEDURE — 99214 OFFICE O/P EST MOD 30 MIN: CPT

## 2025-03-26 RX ORDER — CLOPIDOGREL BISULFATE 75 MG/1
75 TABLET ORAL DAILY
COMMUNITY

## 2025-03-26 ASSESSMENT — ENCOUNTER SYMPTOMS
GASTROINTESTINAL NEGATIVE: 1
ALLERGIC/IMMUNOLOGIC NEGATIVE: 1
RESPIRATORY NEGATIVE: 1
RHINORRHEA: 1
EYES NEGATIVE: 1

## 2025-03-26 NOTE — PROGRESS NOTES
Chief Complaint   Patient presents with    Check-Up       HPI:  Christina Alfaro is a 64 y.o. (: 1960) here today   for check up.  HPI  Ongoing issues w/ anxiety noted.  Had TMS (transcranial magnetic stimulation) treatments.  Finished last Friday.  Did not see much benefit.  Going to [place] tomorrow or NSR (neurosympathetic reset).  Has been eating well, deep breathing, meditating, cold showers.  Has been seeing mental health nurse practitioner.  Off paxil and now on zoloft.  Sleep overall improved.  Sometimes diff motivating in am due to anxiety.  Does feel some improvement in zoloft over paxil.  Did try inc zoloft o 150mg for a period of time. Did not see much difference.      Had been seen by endo.  Had dexamethasone suppression test.  Plans on hormone replacement pellet to be placed by endo as well. Plans on inc estrogen.  Lower testosterone.      Patient's medications, allergies, past medical, surgical, social and family histories were reviewed and updated as appropriate.    ROS:  Review of Systems   Constitutional:  Negative for fever.   Psychiatric/Behavioral:  Positive for dysphoric mood. The patient is nervous/anxious.            Hemoglobin A1C (%)   Date Value   2024 5.2       Past Medical History:   Diagnosis Date    Anxiety     Esophageal reflux     Fibromyalgia     GERD (gastroesophageal reflux disease)     Hydronephrosis 2020    Unspecified sleep apnea     cpap       Family History   Problem Relation Age of Onset    Arthritis Mother     Asthma Mother     High Blood Pressure Mother     Heart Disease Sister     Heart Disease Father     Cancer Sister         breast       Social History     Socioeconomic History    Marital status:      Spouse name: Malcolm    Number of children: Not on file    Years of education: Not on file    Highest education level: Not on file   Occupational History    Not on file   Tobacco Use    Smoking status: Never    Smokeless tobacco: Never   Substance and 
Hearing normal.      Left Ear: Hearing normal.   Eyes:      Extraocular Movements: Extraocular movements intact.      Conjunctiva/sclera: Conjunctivae normal.   Neck:      Trachea: No tracheal deviation.   Cardiovascular:      Rate and Rhythm: Normal rate and regular rhythm.   Pulmonary:      Effort: Pulmonary effort is normal.      Breath sounds: Normal breath sounds.   Skin:     General: Skin is warm and dry.   Neurological:      Mental Status: She is alert and oriented to person, place, and time.   Psychiatric:         Behavior: Behavior normal.            An electronic signature was used to authenticate this note.    --Noe Minor

## 2025-03-27 ENCOUNTER — RESULTS FOLLOW-UP (OUTPATIENT)
Dept: FAMILY MEDICINE CLINIC | Age: 65
End: 2025-03-27

## 2025-03-27 DIAGNOSIS — Z91.89 CARDIOVASCULAR RISK FACTOR: ICD-10-CM

## 2025-03-27 DIAGNOSIS — E78.5 HYPERLIPIDEMIA, UNSPECIFIED HYPERLIPIDEMIA TYPE: ICD-10-CM

## 2025-03-27 LAB — HCYS SERPL-SCNC: 9 UMOL/L (ref 0–10)

## 2025-03-27 NOTE — RESULT ENCOUNTER NOTE
Cholesterol overall significantly improved from last check.  Vitamin D also improved from previous and in the normal range.  B12 is normal, lower than last check, but still well within the normal range.  CMP normal.  Magnesium now in the normal range as well.

## 2025-04-09 ENCOUNTER — OFFICE VISIT (OUTPATIENT)
Dept: FAMILY MEDICINE CLINIC | Age: 65
End: 2025-04-09
Payer: COMMERCIAL

## 2025-04-09 VITALS
SYSTOLIC BLOOD PRESSURE: 136 MMHG | HEART RATE: 85 BPM | BODY MASS INDEX: 32.2 KG/M2 | DIASTOLIC BLOOD PRESSURE: 84 MMHG | OXYGEN SATURATION: 99 % | HEIGHT: 62 IN | WEIGHT: 175 LBS

## 2025-04-09 DIAGNOSIS — F43.10 PTSD (POST-TRAUMATIC STRESS DISORDER): Primary | ICD-10-CM

## 2025-04-09 DIAGNOSIS — F41.9 ANXIETY: ICD-10-CM

## 2025-04-09 DIAGNOSIS — F32.A DEPRESSION, UNSPECIFIED DEPRESSION TYPE: ICD-10-CM

## 2025-04-09 PROCEDURE — 99215 OFFICE O/P EST HI 40 MIN: CPT | Performed by: FAMILY MEDICINE

## 2025-04-09 RX ORDER — MULTIVIT-MIN/IRON/FOLIC ACID/K 18-600-40
2000 CAPSULE ORAL DAILY
COMMUNITY

## 2025-04-09 RX ORDER — DESVENLAFAXINE 25 MG/1
25 TABLET, EXTENDED RELEASE ORAL DAILY
Qty: 30 TABLET | Refills: 0 | Status: SHIPPED | OUTPATIENT
Start: 2025-04-09

## 2025-04-09 RX ORDER — VITAMIN B COMPLEX
1 CAPSULE ORAL DAILY
COMMUNITY

## 2025-04-09 ASSESSMENT — COLUMBIA-SUICIDE SEVERITY RATING SCALE - C-SSRS
5. HAVE YOU STARTED TO WORK OUT OR WORKED OUT THE DETAILS OF HOW TO KILL YOURSELF? DO YOU INTEND TO CARRY OUT THIS PLAN?: NO
1. WITHIN THE PAST MONTH, HAVE YOU WISHED YOU WERE DEAD OR WISHED YOU COULD GO TO SLEEP AND NOT WAKE UP?: YES
2. HAVE YOU ACTUALLY HAD ANY THOUGHTS OF KILLING YOURSELF?: YES
3. HAVE YOU BEEN THINKING ABOUT HOW YOU MIGHT KILL YOURSELF?: NO
6. HAVE YOU EVER DONE ANYTHING, STARTED TO DO ANYTHING, OR PREPARED TO DO ANYTHING TO END YOUR LIFE?: NO
4. HAVE YOU HAD THESE THOUGHTS AND HAD SOME INTENTION OF ACTING ON THEM?: NO

## 2025-04-09 ASSESSMENT — PATIENT HEALTH QUESTIONNAIRE - PHQ9
8. MOVING OR SPEAKING SO SLOWLY THAT OTHER PEOPLE COULD HAVE NOTICED. OR THE OPPOSITE, BEING SO FIGETY OR RESTLESS THAT YOU HAVE BEEN MOVING AROUND A LOT MORE THAN USUAL: NEARLY EVERY DAY
SUM OF ALL RESPONSES TO PHQ QUESTIONS 1-9: 24
6. FEELING BAD ABOUT YOURSELF - OR THAT YOU ARE A FAILURE OR HAVE LET YOURSELF OR YOUR FAMILY DOWN: NEARLY EVERY DAY
5. POOR APPETITE OR OVEREATING: NEARLY EVERY DAY
SUM OF ALL RESPONSES TO PHQ QUESTIONS 1-9: 27
4. FEELING TIRED OR HAVING LITTLE ENERGY: NEARLY EVERY DAY
SUM OF ALL RESPONSES TO PHQ QUESTIONS 1-9: 27
3. TROUBLE FALLING OR STAYING ASLEEP: NEARLY EVERY DAY
SUM OF ALL RESPONSES TO PHQ QUESTIONS 1-9: 27
7. TROUBLE CONCENTRATING ON THINGS, SUCH AS READING THE NEWSPAPER OR WATCHING TELEVISION: NEARLY EVERY DAY
2. FEELING DOWN, DEPRESSED OR HOPELESS: NEARLY EVERY DAY
1. LITTLE INTEREST OR PLEASURE IN DOING THINGS: NEARLY EVERY DAY
10. IF YOU CHECKED OFF ANY PROBLEMS, HOW DIFFICULT HAVE THESE PROBLEMS MADE IT FOR YOU TO DO YOUR WORK, TAKE CARE OF THINGS AT HOME, OR GET ALONG WITH OTHER PEOPLE: EXTREMELY DIFFICULT
9. THOUGHTS THAT YOU WOULD BE BETTER OFF DEAD, OR OF HURTING YOURSELF: NEARLY EVERY DAY

## 2025-04-09 ASSESSMENT — ANXIETY QUESTIONNAIRES
3. WORRYING TOO MUCH ABOUT DIFFERENT THINGS: NEARLY EVERY DAY
7. FEELING AFRAID AS IF SOMETHING AWFUL MIGHT HAPPEN: NEARLY EVERY DAY
6. BECOMING EASILY ANNOYED OR IRRITABLE: NEARLY EVERY DAY
4. TROUBLE RELAXING: NEARLY EVERY DAY
1. FEELING NERVOUS, ANXIOUS, OR ON EDGE: NEARLY EVERY DAY
IF YOU CHECKED OFF ANY PROBLEMS ON THIS QUESTIONNAIRE, HOW DIFFICULT HAVE THESE PROBLEMS MADE IT FOR YOU TO DO YOUR WORK, TAKE CARE OF THINGS AT HOME, OR GET ALONG WITH OTHER PEOPLE: EXTREMELY DIFFICULT
5. BEING SO RESTLESS THAT IT IS HARD TO SIT STILL: NEARLY EVERY DAY
GAD7 TOTAL SCORE: 21
2. NOT BEING ABLE TO STOP OR CONTROL WORRYING: NEARLY EVERY DAY

## 2025-04-09 NOTE — PROGRESS NOTES
Christina Alfaro (:  1960) is a 64 y.o. female,Established patient, here for evaluation of the following chief complaint(s):  Follow-up (Discuss changing the sertraline, patient states it has not been working for her. /Patient would like to discuss Pristiq ), Depression, and Anxiety           Assessment & Plan  PTSD (post-traumatic stress disorder)    Patient would like to try Pristiq.  It was indicated on her GeneSight testing as 1 that may be more beneficial to her.  There have been other family members who have benefited from this.  I did discuss with her that this is a combined SSRI SNRI.  The sertraline she is currently taking also appears to be supported by the GeneSight testing.  She is not on the maximum dose of thI did mentione  sertraline.  We did discuss potentially increasing the dose as this might be beneficial.  She would rather try the Pristiq.  I did mention I have concerns with stepping in having only seen her once in reviewing her significant mental health history.  I would like her to follow-up with psychiatry and her PCP Dr. Donaldson.  We did briefly consult and he agrees to trying her on the Pristiq.  As she has not maxed out the sertraline I am going to leave this in place while we start the Pristiq.  Her PCP or psychiatrist can remove this if necessary going forward.    Regarding her agitated mental state I did offer inpatient evaluation to see if we could start something sooner potentially a combination cocktail of medications if needed.  She refuses this.  Denies acute suicidality.    Reviewing her chart I do see a history of marijuana use.  There is concern with this provoking anxiety and paranoia in some people.  Also it is an additional drug that crosses the blood-brain barrier and will likely interact with her prescribed medications.    Typically with anxiety disorders and PTSD we have to get to a maximum tolerated dose.  Titration will be left up to psychiatry or primary care

## 2025-04-14 ENCOUNTER — OFFICE VISIT (OUTPATIENT)
Dept: FAMILY MEDICINE CLINIC | Age: 65
End: 2025-04-14
Payer: COMMERCIAL

## 2025-04-14 VITALS
SYSTOLIC BLOOD PRESSURE: 136 MMHG | DIASTOLIC BLOOD PRESSURE: 86 MMHG | WEIGHT: 173 LBS | HEART RATE: 90 BPM | BODY MASS INDEX: 31.83 KG/M2 | HEIGHT: 62 IN | OXYGEN SATURATION: 97 %

## 2025-04-14 DIAGNOSIS — F32.A DEPRESSION, UNSPECIFIED DEPRESSION TYPE: ICD-10-CM

## 2025-04-14 DIAGNOSIS — F43.10 PTSD (POST-TRAUMATIC STRESS DISORDER): ICD-10-CM

## 2025-04-14 DIAGNOSIS — F41.9 ANXIETY: ICD-10-CM

## 2025-04-14 DIAGNOSIS — R10.9 LEFT SIDED ABDOMINAL PAIN: Primary | ICD-10-CM

## 2025-04-14 PROCEDURE — 99213 OFFICE O/P EST LOW 20 MIN: CPT | Performed by: FAMILY MEDICINE

## 2025-04-14 ASSESSMENT — ENCOUNTER SYMPTOMS
DIARRHEA: 0
ABDOMINAL PAIN: 0
CONSTIPATION: 0

## 2025-04-14 NOTE — PROGRESS NOTES
Chief Complaint   Patient presents with    ED Follow-up     Patient was seen at [facility] ER on 3/31/25 for left side flank pain.       HPI:  Christina Alfaro is a 64 y.o. (: 1960) here today   for ED follow up.  Patient was seen at [facility] ER on 3/31/25 for left side flank pain.  HPI  Presented to [facility] approx 2 weeks ago w/ hematuria and left sided abd pain.  Concern that may have passed kidney stone.  Had sig blood when wiping w/ towelette for urine specimen.  No other urinary sxs noted.  Left sided abd pain.  Had CT.  Dx w/ epiploic appendagitis.  Pain lasted 7-8 days. Now resolved.  No fevers.  Blood in urine has resolved as well.  Pain felt similar to prior kidney stones.  Last kidney stone around .      No sig relief w/ NSR.  Seeing mental health NP in [place].  Valium has been prescribed.  Was recently inc from 2mg bid to tid.  Using more PRN.  Seems to help w/ acute anxiety episodes.  Still trying to get b vitamins.      Patient's medications, allergies, past medical, surgical, social and family histories were reviewed and updated as appropriate.    ROS:  Review of Systems   Constitutional:  Negative for fever.   Gastrointestinal:  Negative for abdominal pain, constipation and diarrhea.   Genitourinary:  Negative for dysuria and hematuria.           Hemoglobin A1C (%)   Date Value   2024 5.2       Past Medical History:   Diagnosis Date    Anxiety     Esophageal reflux     Fibromyalgia     GERD (gastroesophageal reflux disease)     Hydronephrosis 2020    Unspecified sleep apnea     cpap       Family History   Problem Relation Age of Onset    Arthritis Mother     Asthma Mother     High Blood Pressure Mother     Heart Disease Sister     Heart Disease Father     Cancer Sister         breast       Social History     Socioeconomic History    Marital status:      Spouse name: Malcolm    Number of children: Not on file    Years of education: Not on file    Highest education level: Not on file

## 2025-05-07 DIAGNOSIS — F43.10 PTSD (POST-TRAUMATIC STRESS DISORDER): ICD-10-CM

## 2025-05-08 RX ORDER — DESVENLAFAXINE 25 MG/1
25 TABLET, EXTENDED RELEASE ORAL DAILY
Qty: 30 TABLET | Refills: 5 | Status: SHIPPED | OUTPATIENT
Start: 2025-05-08

## 2025-05-08 NOTE — TELEPHONE ENCOUNTER
These medications had been adjusted.  Does she desire to continue on this current dose of Pristiq?

## 2025-06-11 ENCOUNTER — OFFICE VISIT (OUTPATIENT)
Dept: FAMILY MEDICINE CLINIC | Age: 65
End: 2025-06-11
Payer: COMMERCIAL

## 2025-06-11 VITALS
RESPIRATION RATE: 20 BRPM | HEART RATE: 80 BPM | DIASTOLIC BLOOD PRESSURE: 84 MMHG | OXYGEN SATURATION: 98 % | WEIGHT: 171 LBS | BODY MASS INDEX: 31.28 KG/M2 | SYSTOLIC BLOOD PRESSURE: 138 MMHG

## 2025-06-11 DIAGNOSIS — R30.0 DYSURIA: Primary | ICD-10-CM

## 2025-06-11 LAB
BILIRUBIN, POC: NEGATIVE
BLOOD URINE, POC: NORMAL
CLARITY, POC: CLEAR
COLOR, POC: YELLOW
GLUCOSE URINE, POC: NEGATIVE MG/DL
KETONES, POC: 40 MG/DL
LEUKOCYTE EST, POC: NORMAL
NITRITE, POC: NEGATIVE
PH, POC: 6
PROTEIN, POC: NEGATIVE MG/DL
SPECIFIC GRAVITY, POC: 1.01
UROBILINOGEN, POC: 0.2 MG/DL

## 2025-06-11 PROCEDURE — 81002 URINALYSIS NONAUTO W/O SCOPE: CPT | Performed by: FAMILY MEDICINE

## 2025-06-11 PROCEDURE — 99213 OFFICE O/P EST LOW 20 MIN: CPT | Performed by: FAMILY MEDICINE

## 2025-06-11 RX ORDER — ESCITALOPRAM OXALATE 10 MG/1
10 TABLET ORAL DAILY
COMMUNITY
Start: 2025-06-10

## 2025-06-11 RX ORDER — SULFAMETHOXAZOLE AND TRIMETHOPRIM 800; 160 MG/1; MG/1
1 TABLET ORAL 2 TIMES DAILY
Qty: 14 TABLET | Refills: 0 | Status: SHIPPED | OUTPATIENT
Start: 2025-06-11 | End: 2025-06-18

## 2025-06-11 RX ORDER — BUSPIRONE HYDROCHLORIDE 5 MG/1
TABLET ORAL
COMMUNITY
Start: 2025-06-10

## 2025-06-11 RX ORDER — FLUCONAZOLE 150 MG/1
150 TABLET ORAL ONCE
Qty: 1 TABLET | Refills: 0 | Status: SHIPPED | OUTPATIENT
Start: 2025-06-11 | End: 2025-06-11

## 2025-06-11 NOTE — PROGRESS NOTES
Christina Alfaro (:  1960) is a 64 y.o. female,Established patient, here for evaluation of the following chief complaint(s):  Urinary Tract Infection           Assessment & Plan  Dysuria    2-day history of dysuria.  Using over-the-counter Azo.  She was treated for urinary tract infection 2 weeks ago but did not complete the antibiotics due to hospitalization for an unrelated condition.  Current status may represent recurrent infection versus relapse from failed treatment.  Prescribed Bactrim follow-up with Diflucan.  If not improved recommend urology for recurrent cystitis    Orders:    POCT Urinalysis no Micro    Culture, Urine    sulfamethoxazole-trimethoprim (BACTRIM DS;SEPTRA DS) 800-160 MG per tablet; Take 1 tablet by mouth 2 times daily for 7 days    fluconazole (DIFLUCAN) 150 MG tablet; Take 1 tablet by mouth once for 1 dose      Return if symptoms worsen or fail to improve.       Subjective   HPI  64-year-old female here for acute office visit complains of burning with urination and even some discomfort at rest.  Increased urinary frequency.  Had a UTI partially treated with Keflex 2 weeks ago did not complete the treatment due to inpatient admission for a different condition.  No fevers chills nausea vomiting or back pain      Prior to Visit Medications    Medication Sig Taking? Authorizing Provider   busPIRone (BUSPAR) 5 MG tablet take 1 tablet by oral route at 9am and 3pm everyday Yes Donna Maguire MD   escitalopram (LEXAPRO) 10 MG tablet Take 1 tablet by mouth daily Yes Donna Maguire MD   sulfamethoxazole-trimethoprim (BACTRIM DS;SEPTRA DS) 800-160 MG per tablet Take 1 tablet by mouth 2 times daily for 7 days Yes Diomedes Self MD   fluconazole (DIFLUCAN) 150 MG tablet Take 1 tablet by mouth once for 1 dose Yes Diomedes Self MD   desvenlafaxine succinate (PRISTIQ) 25 MG TB24 extended release tablet TAKE 1 TABLET BY MOUTH DAILY Yes Reese Donaldson MD   b complex vitamins capsule Take

## 2025-06-12 LAB — BACTERIA UR CULT: NORMAL

## 2025-08-29 ENCOUNTER — APPOINTMENT (OUTPATIENT)
Dept: GENERAL RADIOLOGY | Age: 65
End: 2025-08-29
Payer: COMMERCIAL

## 2025-08-29 ENCOUNTER — HOSPITAL ENCOUNTER (EMERGENCY)
Age: 65
Discharge: HOME | End: 2025-08-29
Attending: STUDENT IN AN ORGANIZED HEALTH CARE EDUCATION/TRAINING PROGRAM
Payer: COMMERCIAL

## 2025-08-29 ENCOUNTER — APPOINTMENT (OUTPATIENT)
Dept: CT IMAGING | Age: 65
End: 2025-08-29
Payer: COMMERCIAL

## 2025-08-29 VITALS
BODY MASS INDEX: 26.68 KG/M2 | TEMPERATURE: 97.7 F | RESPIRATION RATE: 19 BRPM | SYSTOLIC BLOOD PRESSURE: 155 MMHG | OXYGEN SATURATION: 100 % | WEIGHT: 145 LBS | HEART RATE: 70 BPM | HEIGHT: 62 IN | DIASTOLIC BLOOD PRESSURE: 78 MMHG

## 2025-08-29 DIAGNOSIS — M79.604 PAIN IN BOTH LOWER EXTREMITIES: Primary | ICD-10-CM

## 2025-08-29 DIAGNOSIS — R07.9 CHEST PAIN, UNSPECIFIED TYPE: ICD-10-CM

## 2025-08-29 DIAGNOSIS — M79.605 PAIN IN BOTH LOWER EXTREMITIES: Primary | ICD-10-CM

## 2025-08-29 DIAGNOSIS — R00.2 PALPITATIONS: ICD-10-CM

## 2025-08-29 LAB
ALBUMIN SERPL-MCNC: 3.8 G/DL (ref 3.4–5)
ALBUMIN/GLOB SERPL: 1.4 {RATIO} (ref 1.1–2.2)
ALP SERPL-CCNC: 108 U/L (ref 40–129)
ALT SERPL-CCNC: 12 U/L (ref 10–40)
ANION GAP SERPL CALCULATED.3IONS-SCNC: 11 MMOL/L (ref 3–16)
AST SERPL-CCNC: 23 U/L (ref 15–37)
BASOPHILS # BLD: 0 K/UL (ref 0–0.2)
BASOPHILS NFR BLD: 0.8 %
BILIRUB SERPL-MCNC: 0.4 MG/DL (ref 0–1)
BUN SERPL-MCNC: 13 MG/DL (ref 7–20)
CALCIUM SERPL-MCNC: 9.4 MG/DL (ref 8.3–10.6)
CHLORIDE SERPL-SCNC: 101 MMOL/L (ref 99–110)
CK SERPL-CCNC: 30 U/L (ref 26–192)
CO2 SERPL-SCNC: 25 MMOL/L (ref 21–32)
CREAT SERPL-MCNC: 0.7 MG/DL (ref 0.6–1.2)
DEPRECATED RDW RBC AUTO: 13.8 % (ref 12.4–15.4)
EKG ATRIAL RATE: 96 BPM
EKG DIAGNOSIS: NORMAL
EKG P AXIS: 70 DEGREES
EKG P-R INTERVAL: 130 MS
EKG Q-T INTERVAL: 358 MS
EKG QRS DURATION: 76 MS
EKG QTC CALCULATION (BAZETT): 452 MS
EKG R AXIS: 1 DEGREES
EKG T AXIS: 66 DEGREES
EKG VENTRICULAR RATE: 96 BPM
EOSINOPHIL # BLD: 0.1 K/UL (ref 0–0.6)
EOSINOPHIL NFR BLD: 1.7 %
GFR SERPLBLD CREATININE-BSD FMLA CKD-EPI: >90 ML/MIN/{1.73_M2}
GLUCOSE SERPL-MCNC: 103 MG/DL (ref 70–99)
HCT VFR BLD AUTO: 45.7 % (ref 36–48)
HGB BLD-MCNC: 15.4 G/DL (ref 12–16)
LYMPHOCYTES # BLD: 1.3 K/UL (ref 1–5.1)
LYMPHOCYTES NFR BLD: 29.3 %
MCH RBC QN AUTO: 30.3 PG (ref 26–34)
MCHC RBC AUTO-ENTMCNC: 33.8 G/DL (ref 31–36)
MCV RBC AUTO: 89.6 FL (ref 80–100)
MONOCYTES # BLD: 0.3 K/UL (ref 0–1.3)
MONOCYTES NFR BLD: 5.7 %
NEUTROPHILS # BLD: 2.8 K/UL (ref 1.7–7.7)
NEUTROPHILS NFR BLD: 62.5 %
NT-PROBNP SERPL-MCNC: 103 PG/ML (ref 0–124)
PLATELET # BLD AUTO: 218 K/UL (ref 135–450)
PMV BLD AUTO: 7.7 FL (ref 5–10.5)
POTASSIUM SERPL-SCNC: 4.2 MMOL/L (ref 3.5–5.1)
PROT SERPL-MCNC: 6.6 G/DL (ref 6.4–8.2)
RBC # BLD AUTO: 5.1 M/UL (ref 4–5.2)
SODIUM SERPL-SCNC: 137 MMOL/L (ref 136–145)
TROPONIN, HIGH SENSITIVITY: <6 NG/L (ref 0–14)
TROPONIN, HIGH SENSITIVITY: <6 NG/L (ref 0–14)
TSH SERPL DL<=0.005 MIU/L-ACNC: 2.57 UIU/ML (ref 0.27–4.2)
WBC # BLD AUTO: 4.5 K/UL (ref 4–11)

## 2025-08-29 PROCEDURE — 84484 ASSAY OF TROPONIN QUANT: CPT

## 2025-08-29 PROCEDURE — 70450 CT HEAD/BRAIN W/O DYE: CPT

## 2025-08-29 PROCEDURE — 51798 US URINE CAPACITY MEASURE: CPT

## 2025-08-29 PROCEDURE — 80053 COMPREHEN METABOLIC PANEL: CPT

## 2025-08-29 PROCEDURE — 71046 X-RAY EXAM CHEST 2 VIEWS: CPT

## 2025-08-29 PROCEDURE — 83880 ASSAY OF NATRIURETIC PEPTIDE: CPT

## 2025-08-29 PROCEDURE — 36415 COLL VENOUS BLD VENIPUNCTURE: CPT

## 2025-08-29 PROCEDURE — 84443 ASSAY THYROID STIM HORMONE: CPT

## 2025-08-29 PROCEDURE — 93005 ELECTROCARDIOGRAM TRACING: CPT | Performed by: STUDENT IN AN ORGANIZED HEALTH CARE EDUCATION/TRAINING PROGRAM

## 2025-08-29 PROCEDURE — 82550 ASSAY OF CK (CPK): CPT

## 2025-08-29 PROCEDURE — 85025 COMPLETE CBC W/AUTO DIFF WBC: CPT

## 2025-08-29 PROCEDURE — 99285 EMERGENCY DEPT VISIT HI MDM: CPT

## 2025-08-29 PROCEDURE — 93010 ELECTROCARDIOGRAM REPORT: CPT | Performed by: INTERNAL MEDICINE

## 2025-08-29 ASSESSMENT — PAIN - FUNCTIONAL ASSESSMENT: PAIN_FUNCTIONAL_ASSESSMENT: 0-10

## 2025-08-29 ASSESSMENT — HEART SCORE: ECG: NORMAL

## 2025-08-29 ASSESSMENT — PAIN DESCRIPTION - ORIENTATION: ORIENTATION: RIGHT;LEFT

## 2025-08-29 ASSESSMENT — PAIN DESCRIPTION - LOCATION: LOCATION: LEG

## 2025-08-29 ASSESSMENT — LIFESTYLE VARIABLES
HOW OFTEN DO YOU HAVE A DRINK CONTAINING ALCOHOL: NEVER
HOW MANY STANDARD DRINKS CONTAINING ALCOHOL DO YOU HAVE ON A TYPICAL DAY: PATIENT DOES NOT DRINK

## 2025-08-29 ASSESSMENT — PAIN SCALES - GENERAL: PAINLEVEL_OUTOF10: 9

## 2025-09-02 ENCOUNTER — OFFICE VISIT (OUTPATIENT)
Dept: FAMILY MEDICINE CLINIC | Age: 65
End: 2025-09-02
Payer: COMMERCIAL

## 2025-09-02 VITALS
OXYGEN SATURATION: 98 % | DIASTOLIC BLOOD PRESSURE: 84 MMHG | BODY MASS INDEX: 26.52 KG/M2 | SYSTOLIC BLOOD PRESSURE: 146 MMHG | HEART RATE: 90 BPM | WEIGHT: 145 LBS | RESPIRATION RATE: 20 BRPM

## 2025-09-02 DIAGNOSIS — N76.1 CHRONIC VAGINITIS: ICD-10-CM

## 2025-09-02 DIAGNOSIS — F32.A DEPRESSION, UNSPECIFIED DEPRESSION TYPE: ICD-10-CM

## 2025-09-02 DIAGNOSIS — F43.10 PTSD (POST-TRAUMATIC STRESS DISORDER): ICD-10-CM

## 2025-09-02 DIAGNOSIS — G47.33 OSA (OBSTRUCTIVE SLEEP APNEA): ICD-10-CM

## 2025-09-02 DIAGNOSIS — F41.9 ANXIETY: Primary | ICD-10-CM

## 2025-09-02 LAB
25(OH)D3 SERPL-MCNC: 63.2 NG/ML
ALBUMIN SERPL-MCNC: 4.2 G/DL (ref 3.4–5)
ALBUMIN/GLOB SERPL: 1.7 {RATIO} (ref 1.1–2.2)
ALP SERPL-CCNC: 99 U/L (ref 40–129)
ALT SERPL-CCNC: 12 U/L (ref 10–40)
ANION GAP SERPL CALCULATED.3IONS-SCNC: 10 MMOL/L (ref 3–16)
AST SERPL-CCNC: 16 U/L (ref 15–37)
BASOPHILS # BLD: 0.1 K/UL (ref 0–0.2)
BASOPHILS NFR BLD: 1.3 %
BILIRUB SERPL-MCNC: 0.4 MG/DL (ref 0–1)
BUN SERPL-MCNC: 19 MG/DL (ref 7–20)
CALCIUM SERPL-MCNC: 9.3 MG/DL (ref 8.3–10.6)
CHLORIDE SERPL-SCNC: 105 MMOL/L (ref 99–110)
CO2 SERPL-SCNC: 27 MMOL/L (ref 21–32)
CREAT SERPL-MCNC: 0.8 MG/DL (ref 0.6–1.2)
DEPRECATED RDW RBC AUTO: 13.1 % (ref 12.4–15.4)
EOSINOPHIL # BLD: 0.1 K/UL (ref 0–0.6)
EOSINOPHIL NFR BLD: 2.5 %
FOLATE SERPL-MCNC: 20.5 NG/ML (ref 4.78–24.2)
GFR SERPLBLD CREATININE-BSD FMLA CKD-EPI: 82 ML/MIN/{1.73_M2}
GLUCOSE SERPL-MCNC: 116 MG/DL (ref 70–99)
HCT VFR BLD AUTO: 41.1 % (ref 36–48)
HGB BLD-MCNC: 14.2 G/DL (ref 12–16)
LYMPHOCYTES # BLD: 1.2 K/UL (ref 1–5.1)
LYMPHOCYTES NFR BLD: 23.8 %
MCH RBC QN AUTO: 30.8 PG (ref 26–34)
MCHC RBC AUTO-ENTMCNC: 34.6 G/DL (ref 31–36)
MCV RBC AUTO: 88.9 FL (ref 80–100)
MONOCYTES # BLD: 0.3 K/UL (ref 0–1.3)
MONOCYTES NFR BLD: 6.4 %
NEUTROPHILS # BLD: 3.3 K/UL (ref 1.7–7.7)
NEUTROPHILS NFR BLD: 66 %
PLATELET # BLD AUTO: 197 K/UL (ref 135–450)
PMV BLD AUTO: 8.6 FL (ref 5–10.5)
POTASSIUM SERPL-SCNC: 4.6 MMOL/L (ref 3.5–5.1)
PROT SERPL-MCNC: 6.7 G/DL (ref 6.4–8.2)
RBC # BLD AUTO: 4.62 M/UL (ref 4–5.2)
SODIUM SERPL-SCNC: 142 MMOL/L (ref 136–145)
VIT B12 SERPL-MCNC: 723 PG/ML (ref 211–911)
WBC # BLD AUTO: 5 K/UL (ref 4–11)

## 2025-09-02 PROCEDURE — 36415 COLL VENOUS BLD VENIPUNCTURE: CPT | Performed by: FAMILY MEDICINE

## 2025-09-02 PROCEDURE — 99214 OFFICE O/P EST MOD 30 MIN: CPT | Performed by: FAMILY MEDICINE

## 2025-09-02 RX ORDER — DULOXETIN HYDROCHLORIDE 60 MG/1
120 CAPSULE, DELAYED RELEASE ORAL DAILY
COMMUNITY
Start: 2025-07-14

## 2025-09-02 RX ORDER — BUPROPION HYDROCHLORIDE 150 MG/1
150 TABLET ORAL EVERY MORNING
Qty: 30 TABLET | Refills: 5 | Status: SHIPPED | OUTPATIENT
Start: 2025-09-02

## 2025-09-03 LAB
BV BACTERIA DNA VAG QL NAA+PROBE: NOT DETECTED
C GLABRATA DNA VAG QL NAA+PROBE: NORMAL
C GLABRATA DNA VAG QL NAA+PROBE: NOT DETECTED
C KRUSEI DNA VAG QL NAA+PROBE: NOT DETECTED
CANDIDA DNA VAG QL NAA+PROBE: NOT DETECTED
T VAGINALIS DNA VAG QL NAA+PROBE: NOT DETECTED

## (undated) DEVICE — BOWL MED L 32OZ PLAS W/ MOLD GRAD EZ OPN PEEL PCH

## (undated) DEVICE — CYSTO/BLADDER IRRIGATION SET, REGULATING CLAMP

## (undated) DEVICE — SOLUTION IV IRRIG 500ML 0.9% SODIUM CHL 2F7123

## (undated) DEVICE — MEDI-VAC NON-CONDUCTIVE SUCTION TUBING: Brand: CARDINAL HEALTH

## (undated) DEVICE — DBD-PACK,CYSTOSCOPY,PK VI,AURORA: Brand: MEDLINE

## (undated) DEVICE — GAUZE,SPONGE,4"X4",8PLY,STRL,LF,10/TRAY: Brand: MEDLINE

## (undated) DEVICE — GLOVE ORANGE PI 7 1/2   MSG9075

## (undated) DEVICE — GOWN SIRUS NONREIN LG W/TWL: Brand: MEDLINE INDUSTRIES, INC.

## (undated) DEVICE — INVIEW CLEAR LEGGINGS: Brand: CONVERTORS

## (undated) DEVICE — PREP SOL PVP IODINE 4%  4 OZ/BTL

## (undated) DEVICE — BAG URIN STRL FOR URO CTCH SYS

## (undated) DEVICE — STONE RETRIEVAL BASKET: Brand: SEGURA HEMISPHERE

## (undated) DEVICE — CIRCUIT ANES L72IN 3L BACT AND VIR FLTR EL CONN SGL LIMB

## (undated) DEVICE — Z DUP USE 2522782 SOLUTION IRRIG 1000ML STRL H2O PLAS CONTAINER UROMATIC

## (undated) DEVICE — Z DUP USE 2139333 GUIDEWIRE UROLOGICAL STR STD 0.035 IN X150 CM REG ZIPWIRE LF

## (undated) DEVICE — CANNULA NSL 13FT TUBE AD ETCO2 DIV SAMP M

## (undated) DEVICE — Z CONVERTED USE 2271043 CONTAINER SPEC COLL 4OZ SCR ON LID PEEL PCH

## (undated) DEVICE — SYRINGE MED 10ML LUERLOCK TIP W/O SFTY DISP